# Patient Record
Sex: FEMALE | Race: WHITE
[De-identification: names, ages, dates, MRNs, and addresses within clinical notes are randomized per-mention and may not be internally consistent; named-entity substitution may affect disease eponyms.]

---

## 2017-01-06 ENCOUNTER — HOSPITAL ENCOUNTER (OUTPATIENT)
Dept: HOSPITAL 53 - M SFHCPLAZ | Age: 47
End: 2017-01-06
Attending: FAMILY MEDICINE
Payer: COMMERCIAL

## 2017-01-06 DIAGNOSIS — K52.9: Primary | ICD-10-CM

## 2017-01-06 LAB
ALBUMIN SERPL BCG-MCNC: 3.8 GM/DL (ref 3.2–5.2)
ALBUMIN/GLOB SERPL: 1.15 {RATIO} (ref 1–1.93)
ALP SERPL-CCNC: 144 U/L (ref 45–117)
ALT SERPL W P-5'-P-CCNC: 117 U/L (ref 12–78)
AMYLASE SERPL-CCNC: 40 U/L (ref 25–115)
AST SERPL-CCNC: 52 U/L (ref 15–37)
BASOPHILS # BLD AUTO: 0 K/MM3 (ref 0–0.2)
BASOPHILS NFR BLD AUTO: 0.4 % (ref 0–1)
BILIRUB CONJ SERPL-MCNC: < 0.1 MG/DL (ref 0–0.2)
BILIRUB SERPL-MCNC: 0.3 MG/DL (ref 0.2–1)
EOSINOPHIL # BLD AUTO: 0.2 K/MM3 (ref 0–0.5)
EOSINOPHIL NFR BLD AUTO: 2.7 % (ref 0–3)
ERYTHROCYTE [DISTWIDTH] IN BLOOD BY AUTOMATED COUNT: 12.1 % (ref 11.5–14.5)
ERYTHROCYTE [SEDIMENTATION RATE] IN BLOOD BY WESTERGREN METHOD: 12 MM/HR (ref 0–20)
LARGE UNSTAINED CELL #: 0.2 K/MM3 (ref 0–0.4)
LARGE UNSTAINED CELL %: 2 % (ref 0–4)
LYMPHOCYTES # BLD AUTO: 2.4 K/MM3 (ref 1.5–4.5)
LYMPHOCYTES NFR BLD AUTO: 27.8 % (ref 24–44)
MCH RBC QN AUTO: 30 PG (ref 27–33)
MCHC RBC AUTO-ENTMCNC: 32.9 G/DL (ref 32–36.5)
MCV RBC AUTO: 91.5 FL (ref 80–96)
MONOCYTES # BLD AUTO: 0.7 K/MM3 (ref 0–0.8)
MONOCYTES NFR BLD AUTO: 7.6 % (ref 0–5)
NEUTROPHILS # BLD AUTO: 5.1 K/MM3 (ref 1.8–7.7)
NEUTROPHILS NFR BLD AUTO: 59.5 % (ref 36–66)
PLATELET # BLD AUTO: 418 K/MM3 (ref 150–450)
PROT SERPL-MCNC: 7.1 GM/DL (ref 6.4–8.2)
WBC # BLD AUTO: 8.6 K/MM3 (ref 4–10)

## 2017-01-30 ENCOUNTER — HOSPITAL ENCOUNTER (OUTPATIENT)
Dept: HOSPITAL 53 - M SFHCPLAZ | Age: 47
End: 2017-01-30
Attending: PHYSICIAN ASSISTANT
Payer: COMMERCIAL

## 2017-01-30 DIAGNOSIS — R31.9: Primary | ICD-10-CM

## 2017-01-31 ENCOUNTER — HOSPITAL ENCOUNTER (OUTPATIENT)
Dept: HOSPITAL 53 - M SFHCPLAZ | Age: 47
End: 2017-01-31
Attending: PHYSICIAN ASSISTANT
Payer: COMMERCIAL

## 2017-01-31 ENCOUNTER — HOSPITAL ENCOUNTER (OUTPATIENT)
Dept: HOSPITAL 53 - M RAD | Age: 47
End: 2017-01-31
Attending: PHYSICIAN ASSISTANT
Payer: COMMERCIAL

## 2017-01-31 DIAGNOSIS — R31.9: Primary | ICD-10-CM

## 2017-01-31 LAB
ANION GAP SERPL CALC-SCNC: 10 MEQ/L (ref 8–16)
BASOPHILS # BLD AUTO: 0.1 K/MM3 (ref 0–0.2)
BASOPHILS NFR BLD AUTO: 1.3 % (ref 0–1)
BUN SERPL-MCNC: 10 MG/DL (ref 7–18)
CALCIUM SERPL-MCNC: 9 MG/DL (ref 8.5–10.1)
CHLORIDE SERPL-SCNC: 104 MEQ/L (ref 98–107)
CO2 SERPL-SCNC: 27 MEQ/L (ref 21–32)
CREAT SERPL-MCNC: 0.75 MG/DL (ref 0.55–1.02)
EOSINOPHIL # BLD AUTO: 0.1 K/MM3 (ref 0–0.5)
EOSINOPHIL NFR BLD AUTO: 1.6 % (ref 0–3)
ERYTHROCYTE [DISTWIDTH] IN BLOOD BY AUTOMATED COUNT: 13.1 % (ref 11.5–14.5)
GFR SERPL CREATININE-BSD FRML MDRD: > 60 ML/MIN/{1.73_M2} (ref 58–?)
GLUCOSE SERPL-MCNC: 97 MG/DL (ref 70–105)
LARGE UNSTAINED CELL #: 0.1 K/MM3 (ref 0–0.4)
LARGE UNSTAINED CELL %: 1.4 % (ref 0–4)
LYMPHOCYTES # BLD AUTO: 2.3 K/MM3 (ref 1.5–4.5)
LYMPHOCYTES NFR BLD AUTO: 28.1 % (ref 24–44)
MCH RBC QN AUTO: 30.5 PG (ref 27–33)
MCHC RBC AUTO-ENTMCNC: 32.8 G/DL (ref 32–36.5)
MCV RBC AUTO: 92.7 FL (ref 80–96)
MONOCYTES # BLD AUTO: 0.5 K/MM3 (ref 0–0.8)
MONOCYTES NFR BLD AUTO: 6.7 % (ref 0–5)
NEUTROPHILS # BLD AUTO: 4.9 K/MM3 (ref 1.8–7.7)
NEUTROPHILS NFR BLD AUTO: 60.9 % (ref 36–66)
PLATELET # BLD AUTO: 300 K/MM3 (ref 150–450)
POTASSIUM SERPL-SCNC: 3.9 MEQ/L (ref 3.5–5.1)
SODIUM SERPL-SCNC: 141 MEQ/L (ref 136–145)
WBC # BLD AUTO: 8 K/MM3 (ref 4–10)

## 2017-01-31 NOTE — REP
CT study of the abdomen and pelvis without IV or oral contrast:  Renal stone

protocol.

 

History:  Hematuria.  Question kidney stones.  Right-sided pain.

 

Comparison study December 29, 2016.

 

Findings:  Digital  radiograph demonstrates a normal bowel gas pattern.

There are clips in the right upper quadrant.  Metallic clothing artifact is

projecting in the right para lumbar region.  The lung bases are clear on axial CT

images.

 

The liver and the spleen are normal in size and homogeneous in texture.  There

are clips in the gallbladder fossa.  No adrenal lesion is seen.  A small

accessory splenule is noted in the left upper quadrant unchanged.  No pancreatic

abnormality is seen.  A normal appendix is observed in the right mid abdomen

centrally.  There is a 2.1 cm cystic area in the right ovary.  No significant

adnexal abnormality is seen.  There appear to be tubal ligation clips

bilaterally.

 

There is mild right-sided hydronephrosis and rounded fullness in the renal pelvis

on the right suggests some degree of ureteropelvic junction obstruction on the

right side.  This morphologic appearance is unchanged from December 29, 2016

prior study.  No ureteral or intrarenal calculus is observed.  No bladder mass or

calculus is seen.  There are two or three diverticula in the left colon without

CT evidence of diverticulitis.  Small and large intestinal bowel loops are

otherwise normal.  No abdominal wall defect is seen.

 

Impression:

 

1.  Question mild right ureteropelvic junction obstruction with right-sided

hydronephrosis.

 

2.  No urinary tract calculus seen.

 

3.  Status post cholecystectomy and bilateral tubal ligation.

 

 

Signed by

Yung Flowers MD 01/31/2017 12:32 P

## 2017-02-24 ENCOUNTER — HOSPITAL ENCOUNTER (OUTPATIENT)
Dept: HOSPITAL 53 - M RAD | Age: 47
End: 2017-02-24
Attending: PHYSICIAN ASSISTANT
Payer: COMMERCIAL

## 2017-02-24 DIAGNOSIS — N13.30: Primary | ICD-10-CM

## 2017-02-24 NOTE — REP
Clinical:  Right hydronephrosis for follow up.

 

Comparison:  CT dated 01/31/2017.

 

Technique:  Real time gray scale and color evaluation using curved array

transducer with color Doppler evaluation of the intra renal vasculature.

 

Findings:

 

The right kidney demonstrates moderate hydronephrosis and is normal in contour,

size, echogenicity and reniform shape measuring 10.9 x 4.5 x 6.4 cm.  Doppler

evaluation demonstrates normal intrarenal vascularity.

 

The left kidney is normal in contour, size, echogenicity, reniform shape and

measures 11.8 x 6.3 x 4.9 cm.  Doppler evaluation demonstrates normal intrarenal

vascularity.  There is no hydronephrosis, nephrolithiasis, cystic or mass lesion

appreciated.

 

The bladder is unremarkable and bilateral ureteral jets are appreciated.

 

Impression:

Moderate right hydronephrosis which may be secondary to UPJ obstruction based on

recent CT evaluation.

Normal left kidney and bladder.

 

 

Signed by

Rui Ashraf MD 02/24/2017 11:38 A

## 2017-03-04 ENCOUNTER — HOSPITAL ENCOUNTER (OUTPATIENT)
Dept: HOSPITAL 53 - M LAB | Age: 47
End: 2017-03-04
Attending: FAMILY MEDICINE
Payer: COMMERCIAL

## 2017-03-04 DIAGNOSIS — N13.30: Primary | ICD-10-CM

## 2017-03-04 LAB
ALBUMIN SERPL BCG-MCNC: 3.6 GM/DL (ref 3.2–5.2)
ANION GAP SERPL CALC-SCNC: 8 MEQ/L (ref 8–16)
BUN SERPL-MCNC: 13 MG/DL (ref 7–18)
CALCIUM SERPL-MCNC: 8.6 MG/DL (ref 8.5–10.1)
CHLORIDE SERPL-SCNC: 107 MEQ/L (ref 98–107)
CO2 SERPL-SCNC: 27 MEQ/L (ref 21–32)
CREAT SERPL-MCNC: 0.69 MG/DL (ref 0.55–1.02)
GFR SERPL CREATININE-BSD FRML MDRD: > 60 ML/MIN/{1.73_M2} (ref 58–?)
GLUCOSE SERPL-MCNC: 67 MG/DL (ref 70–105)
PHOSPHATE SERPL-MCNC: 3.1 MG/DL (ref 2.5–4.9)
POTASSIUM SERPL-SCNC: 4.6 MEQ/L (ref 3.5–5.1)
SODIUM SERPL-SCNC: 142 MEQ/L (ref 136–145)

## 2017-03-13 ENCOUNTER — HOSPITAL ENCOUNTER (OUTPATIENT)
Dept: HOSPITAL 53 - M RAD | Age: 47
End: 2017-03-13
Attending: UROLOGY
Payer: COMMERCIAL

## 2017-03-13 DIAGNOSIS — N13.5: Primary | ICD-10-CM

## 2017-03-13 NOTE — REP
Nuclear renal scintigraphy with differential flow and function analysis.

 

History:  Ureteropelvic junction obstruction.

 

Technique:  8.4 mCi of technetium 99m MAG3 is injected and posterior flow and

excretory phase images are acquired.  Renal cortical regions of interest are

drawn and time activity curves are plotted for renal functional analysis.

 

Comparison study:  September 15, 2009.

 

Findings:  Posterior flow images show symmetric perfusion of the renal beds.  No

change from comparison study.  Excretory phase images show no evidence of

intrarenal mass on either side on early images.  The intrarenal collecting

systems are labeled symmetrically at 3 minutes.  Subsequent images demonstrate

fullness of the intrarenal collecting system of the right kidney with a rounded

contour at the ureteropelvic junction consistent with some degree of UPJ

obstruction.  The right ureter is labeled with tracer.  Fullness in the

intrarenal collecting system is more prominent than on the 2009 prior exam.  It

persists on the postvoid image.

 

Differential renal function analysis is essentially normal with 52% of overall

renal cortical counts coming from the left kidney and 48% from the right.  Time

to peak activity is normal bilaterally at 2 minutes on the left and 3.0 minutes

on the right.  Time to half max activity is normal on the left at 9.3 minutes and

somewhat delayed on the right at 16.5 minutes.

 

Impression:

 

Evidence of mild to moderate right-sided hydronephrosis with a somewhat rounded

appearance to the ureteropelvic junction which may reflect UPJ obstruction.  This

is more pronounced in appearance than on the 2009 prior study.

 

 

Signed by

Yung Flowers MD 03/13/2017 07:18 P

## 2017-04-06 ENCOUNTER — HOSPITAL ENCOUNTER (OUTPATIENT)
Dept: HOSPITAL 53 - M SFHCPLAZ | Age: 47
End: 2017-04-06
Attending: PHYSICIAN ASSISTANT
Payer: COMMERCIAL

## 2017-04-06 DIAGNOSIS — N13.30: Primary | ICD-10-CM

## 2017-09-18 ENCOUNTER — HOSPITAL ENCOUNTER (OUTPATIENT)
Dept: HOSPITAL 53 - M SMT | Age: 47
End: 2017-09-18
Attending: UROLOGY
Payer: COMMERCIAL

## 2017-09-18 DIAGNOSIS — N13.5: Primary | ICD-10-CM

## 2017-09-18 NOTE — REP
RENAL AND BLADDER ULTRASOUND:

 

Real-time sonographic evaluation of the kidneys and bladder performed and

compared to the prior study of 02/24/2017.

 

Right kidney measures 10.9 x 5.6 x 4.8 cm and the left kidney 12.3 x 5.8 x 4.7

cm.  There is again moderate right hydronephrosis, which is unchanged since the

prior exam. There is no left hydronephrosis.  There is no renal mass or calculus

identified.  The urinary bladder measures 8.9 x 9.4 x 7.3 cm with no evidence of

mass or calculus.  Volume is 320 mL.  Ureteral jets are seen in the urinary

bladder bilaterally with Doppler color evaluation.

 

IMPRESSION:

 

Stable moderate right hydronephrosis.

 

 

Signed by

Gerardo Nava MD 09/18/2017 05:04 P

## 2017-11-16 ENCOUNTER — HOSPITAL ENCOUNTER (OUTPATIENT)
Dept: HOSPITAL 53 - M SFHCPLAZ | Age: 47
End: 2017-11-16
Attending: PHYSICIAN ASSISTANT
Payer: COMMERCIAL

## 2017-11-16 DIAGNOSIS — N13.30: Primary | ICD-10-CM

## 2017-11-16 LAB
ALBUMIN SERPL BCG-MCNC: 3.8 GM/DL (ref 3.2–5.2)
ANION GAP SERPL CALC-SCNC: 9 MEQ/L (ref 8–16)
BUN SERPL-MCNC: 16 MG/DL (ref 7–18)
CALCIUM SERPL-MCNC: 9.1 MG/DL (ref 8.5–10.1)
CHLORIDE SERPL-SCNC: 105 MEQ/L (ref 98–107)
CO2 SERPL-SCNC: 25 MEQ/L (ref 21–32)
CREAT SERPL-MCNC: 0.74 MG/DL (ref 0.55–1.02)
GFR SERPL CREATININE-BSD FRML MDRD: > 60 ML/MIN/{1.73_M2} (ref 58–?)
GLUCOSE SERPL-MCNC: 82 MG/DL (ref 70–105)
PHOSPHATE SERPL-MCNC: 4 MG/DL (ref 2.5–4.9)
POTASSIUM SERPL-SCNC: 4.6 MEQ/L (ref 3.5–5.1)
SODIUM SERPL-SCNC: 139 MEQ/L (ref 136–145)

## 2017-11-21 ENCOUNTER — HOSPITAL ENCOUNTER (OUTPATIENT)
Dept: HOSPITAL 53 - M SFHCPLAZ | Age: 47
End: 2017-11-21
Attending: FAMILY MEDICINE
Payer: COMMERCIAL

## 2017-11-21 DIAGNOSIS — E78.5: ICD-10-CM

## 2017-11-21 DIAGNOSIS — E66.9: Primary | ICD-10-CM

## 2017-11-21 DIAGNOSIS — E55.9: ICD-10-CM

## 2017-11-21 DIAGNOSIS — N13.30: ICD-10-CM

## 2017-11-21 LAB
CHOLEST SERPL-MCNC: 201 MG/DL (ref ?–200)
EST. AVERAGE GLUCOSE BLD GHB EST-MCNC: 100 MG/DL (ref 60–110)
T4 FREE SERPL-MCNC: 1.09 NG/DL (ref 0.76–1.46)
TRIGL SERPL-MCNC: 96 MG/DL (ref ?–150)
URATE SERPL-MCNC: 4.9 MG/DL (ref 2.6–6)

## 2017-11-27 ENCOUNTER — HOSPITAL ENCOUNTER (OUTPATIENT)
Dept: HOSPITAL 53 - M SMT | Age: 47
End: 2017-11-27
Attending: FAMILY MEDICINE
Payer: COMMERCIAL

## 2017-11-27 DIAGNOSIS — Z53.9: ICD-10-CM

## 2017-11-27 DIAGNOSIS — M75.41: Primary | ICD-10-CM

## 2017-11-30 ENCOUNTER — HOSPITAL ENCOUNTER (OUTPATIENT)
Dept: HOSPITAL 53 - M SMT | Age: 47
End: 2017-11-30
Attending: FAMILY MEDICINE
Payer: COMMERCIAL

## 2017-11-30 DIAGNOSIS — M75.42: Primary | ICD-10-CM

## 2017-11-30 NOTE — REP
Left shoulder series:  Three views.

 

History:  Impingement.

 

Findings:  The left glenohumeral and acromioclavicular joints are normally

aligned.  Periarticular soft tissues are unremarkable.  No other abnormality.

 

Impression:

 

Negative left shoulder radiographs.

 

 

Signed by

Yung Flowers MD 11/30/2017 04:08 P

## 2018-02-13 ENCOUNTER — HOSPITAL ENCOUNTER (OUTPATIENT)
Dept: HOSPITAL 53 - M SFHCPLAZ | Age: 48
End: 2018-02-13
Attending: FAMILY MEDICINE
Payer: COMMERCIAL

## 2018-02-13 DIAGNOSIS — R10.11: Primary | ICD-10-CM

## 2018-02-13 LAB
ALBUMIN/GLOBULIN RATIO: 1.15 (ref 1–1.93)
ALBUMIN: 3.8 GM/DL (ref 3.2–5.2)
ALKALINE PHOSPHATASE: 88 U/L (ref 45–117)
ALT SERPL W P-5'-P-CCNC: 26 U/L (ref 12–78)
ANION GAP: 7 MEQ/L (ref 8–16)
APPEARANCE, URINE: CLEAR
AST SERPL-CCNC: 21 U/L (ref 7–37)
BACTERIA UR QL AUTO: NEGATIVE
BILIRUBIN, URINE AUTO: NEGATIVE
BILIRUBIN,TOTAL: 0.4 MG/DL (ref 0.2–1)
BLOOD UREA NITROGEN: 15 MG/DL (ref 7–18)
BLOOD, URINE BLOOD: NEGATIVE
CALCIUM LEVEL: 9.3 MG/DL (ref 8.5–10.1)
CARBON DIOXIDE LEVEL: 28 MEQ/L (ref 21–32)
CHLORIDE LEVEL: 105 MEQ/L (ref 98–107)
CREATININE FOR GFR: 0.74 MG/DL (ref 0.55–1.3)
GFR SERPL CREATININE-BSD FRML MDRD: > 60 ML/MIN/{1.73_M2} (ref 58–?)
GLUCOSE, FASTING: 82 MG/DL (ref 70–100)
GLUCOSE, URINE (UA) AUTO: NEGATIVE MG/DL
KETONE, URINE AUTO: NEGATIVE MG/DL
LEUKOCYTE ESTERASE UR QL STRIP.AUTO: NEGATIVE
NITRITE, URINE AUTO: NEGATIVE
PH,URINE: 7 UNITS (ref 5–9)
POTASSIUM SERUM: 4.5 MEQ/L (ref 3.5–5.1)
PROT UR QL STRIP.AUTO: NEGATIVE MG/DL
RBC, URINE AUTO: 0 /HPF (ref 0–3)
SODIUM LEVEL: 140 MEQ/L (ref 136–145)
SPECIFIC GRAVITY URINE AUTO: 1.01 (ref 1–1.03)
SQUAMOUS #/AREA URNS AUTO: 0 /HPF (ref 0–6)
TOTAL PROTEIN: 7.1 GM/DL (ref 6.4–8.2)
UROBILINOGEN, URINE AUTO: 0.2 MG/DL (ref 0–2)
WBC, URINE AUTO: 0 /HPF (ref 0–3)

## 2018-02-13 PROCEDURE — 80053 COMPREHEN METABOLIC PANEL: CPT

## 2018-02-16 ENCOUNTER — HOSPITAL ENCOUNTER (OUTPATIENT)
Dept: HOSPITAL 53 - M RAD | Age: 48
End: 2018-02-16
Attending: FAMILY MEDICINE
Payer: COMMERCIAL

## 2018-02-16 DIAGNOSIS — R10.11: ICD-10-CM

## 2018-02-16 DIAGNOSIS — N13.30: Primary | ICD-10-CM

## 2018-02-16 DIAGNOSIS — K76.0: ICD-10-CM

## 2018-02-16 PROCEDURE — 76700 US EXAM ABDOM COMPLETE: CPT

## 2018-03-06 ENCOUNTER — HOSPITAL ENCOUNTER (OUTPATIENT)
Dept: HOSPITAL 53 - M RAD | Age: 48
End: 2018-03-06
Attending: UROLOGY
Payer: COMMERCIAL

## 2018-03-06 DIAGNOSIS — N13.5: Primary | ICD-10-CM

## 2018-04-13 ENCOUNTER — HOSPITAL ENCOUNTER (OUTPATIENT)
Dept: HOSPITAL 53 - M WHC | Age: 48
End: 2018-04-13
Attending: FAMILY MEDICINE
Payer: COMMERCIAL

## 2018-04-13 DIAGNOSIS — Z12.39: Primary | ICD-10-CM

## 2018-04-13 PROCEDURE — 77067 SCR MAMMO BI INCL CAD: CPT

## 2018-05-17 ENCOUNTER — HOSPITAL ENCOUNTER (OUTPATIENT)
Dept: HOSPITAL 53 - M SFHCPLAZ | Age: 48
End: 2018-05-17
Attending: FAMILY MEDICINE
Payer: COMMERCIAL

## 2018-05-17 DIAGNOSIS — E66.9: ICD-10-CM

## 2018-05-17 DIAGNOSIS — E55.9: Primary | ICD-10-CM

## 2018-05-17 DIAGNOSIS — E78.5: ICD-10-CM

## 2018-05-17 LAB
25(OH)D3 SERPL-MCNC: 46.7 NG/ML (ref 30–100)
ALBUMIN/GLOBULIN RATIO: 1.06 (ref 1–1.93)
ALBUMIN: 3.7 GM/DL (ref 3.2–5.2)
ALKALINE PHOSPHATASE: 85 U/L (ref 45–117)
ALT SERPL W P-5'-P-CCNC: 33 U/L (ref 12–78)
ANION GAP: 5 MEQ/L (ref 8–16)
AST SERPL-CCNC: 21 U/L (ref 7–37)
BILIRUBIN,TOTAL: 0.4 MG/DL (ref 0.2–1)
BLOOD UREA NITROGEN: 16 MG/DL (ref 7–18)
CALCIUM LEVEL: 9 MG/DL (ref 8.5–10.1)
CARBON DIOXIDE LEVEL: 27 MEQ/L (ref 21–32)
CHLORIDE LEVEL: 108 MEQ/L (ref 98–107)
CHOLEST SERPL-MCNC: 177 MG/DL (ref ?–200)
CHOLESTEROL RISK RATIO: 3.05 (ref ?–5)
CK SERPL-CCNC: 105 U/L (ref 26–192)
CREATININE FOR GFR: 0.77 MG/DL (ref 0.55–1.3)
CRP SERPL-MCNC: < 0.3 MG/DL (ref 0–0.3)
EST. AVERAGE GLUCOSE BLD GHB EST-MCNC: 105 MG/DL (ref 60–110)
GFR SERPL CREATININE-BSD FRML MDRD: > 60 ML/MIN/{1.73_M2} (ref 58–?)
GLUCOSE, FASTING: 84 MG/DL (ref 70–100)
HDLC SERPL-MCNC: 58 MG/DL (ref 40–?)
LDL CHOLESTEROL: 101.2 MG/DL (ref ?–100)
NONHDLC SERPL-MCNC: 119 MG/DL
POTASSIUM SERUM: 4.6 MEQ/L (ref 3.5–5.1)
PTH-INTACT SERPL-MCNC: 18.8 PG/ML (ref 18.5–88)
SODIUM LEVEL: 140 MEQ/L (ref 136–145)
TOTAL PROTEIN: 7.2 GM/DL (ref 6.4–8.2)
TRIGLYCERIDES LEVEL: 89 MG/DL (ref ?–150)

## 2018-05-17 PROCEDURE — 82550 ASSAY OF CK (CPK): CPT

## 2018-05-18 LAB — INSULIN LEVEL: 9.3 UIU/ML (ref 2.6–24.9)

## 2018-08-01 ENCOUNTER — HOSPITAL ENCOUNTER (OUTPATIENT)
Dept: HOSPITAL 53 - M SFHCWAGY | Age: 48
End: 2018-08-01
Attending: NURSE PRACTITIONER
Payer: COMMERCIAL

## 2018-08-01 DIAGNOSIS — Z12.4: Primary | ICD-10-CM

## 2018-08-21 ENCOUNTER — HOSPITAL ENCOUNTER (OUTPATIENT)
Dept: HOSPITAL 53 - M WHC | Age: 48
End: 2018-08-21
Attending: NURSE PRACTITIONER
Payer: COMMERCIAL

## 2018-08-21 DIAGNOSIS — N92.0: Primary | ICD-10-CM

## 2018-08-21 PROCEDURE — 76830 TRANSVAGINAL US NON-OB: CPT

## 2018-09-13 ENCOUNTER — HOSPITAL ENCOUNTER (OUTPATIENT)
Dept: HOSPITAL 53 - M RAD | Age: 48
End: 2018-09-13
Attending: UROLOGY
Payer: COMMERCIAL

## 2018-09-13 DIAGNOSIS — N13.5: Primary | ICD-10-CM

## 2018-09-28 ENCOUNTER — HOSPITAL ENCOUNTER (OUTPATIENT)
Dept: HOSPITAL 53 - M SFHCPLAZ | Age: 48
End: 2018-09-28
Attending: FAMILY MEDICINE
Payer: COMMERCIAL

## 2018-09-28 DIAGNOSIS — E55.9: ICD-10-CM

## 2018-09-28 DIAGNOSIS — M06.9: Primary | ICD-10-CM

## 2018-09-28 LAB
25(OH)D3 SERPL-MCNC: 35.7 NG/ML (ref 30–100)
ALBUMIN/GLOBULIN RATIO: 1.12 (ref 1–1.93)
ALBUMIN: 3.8 GM/DL (ref 3.2–5.2)
ALKALINE PHOSPHATASE: 86 U/L (ref 45–117)
ALT SERPL W P-5'-P-CCNC: 33 U/L (ref 12–78)
ANION GAP: 9 MEQ/L (ref 8–16)
AST SERPL-CCNC: 22 U/L (ref 7–37)
BILIRUBIN,TOTAL: 0.5 MG/DL (ref 0.2–1)
BLOOD UREA NITROGEN: 13 MG/DL (ref 7–18)
CALCIUM LEVEL: 9 MG/DL (ref 8.5–10.1)
CARBON DIOXIDE LEVEL: 25 MEQ/L (ref 21–32)
CHLORIDE LEVEL: 107 MEQ/L (ref 98–107)
CREATININE FOR GFR: 0.71 MG/DL (ref 0.55–1.3)
GFR SERPL CREATININE-BSD FRML MDRD: > 60 ML/MIN/{1.73_M2} (ref 58–?)
GLUCOSE, FASTING: 80 MG/DL (ref 70–100)
POTASSIUM SERUM: 4.3 MEQ/L (ref 3.5–5.1)
PTH-INTACT SERPL-MCNC: 17.6 PG/ML (ref 18.5–88)
SODIUM LEVEL: 141 MEQ/L (ref 136–145)
TOTAL PROTEIN: 7.2 GM/DL (ref 6.4–8.2)

## 2018-09-28 PROCEDURE — 80053 COMPREHEN METABOLIC PANEL: CPT

## 2018-10-03 LAB — GAD65 AB SER-ACNC: <5 U/ML (ref 0–5)

## 2018-11-01 ENCOUNTER — HOSPITAL ENCOUNTER (OUTPATIENT)
Dept: HOSPITAL 53 - M SFHCWAGY | Age: 48
End: 2018-11-01
Attending: NURSE PRACTITIONER
Payer: COMMERCIAL

## 2018-11-01 DIAGNOSIS — Z53.9: Primary | ICD-10-CM

## 2018-11-01 DIAGNOSIS — Z11.3: Primary | ICD-10-CM

## 2018-11-01 LAB
CHLAMYDIA DNA AMPLIFICATION: NEGATIVE
GC DNA AMPLIFICATION: NEGATIVE

## 2018-11-01 PROCEDURE — 87591 N.GONORRHOEAE DNA AMP PROB: CPT

## 2018-12-16 ENCOUNTER — HOSPITAL ENCOUNTER (OUTPATIENT)
Dept: HOSPITAL 53 - M WUC | Age: 48
End: 2018-12-16
Attending: PHYSICIAN ASSISTANT
Payer: COMMERCIAL
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
## 2018-12-16 DIAGNOSIS — S73.111A: Primary | ICD-10-CM

## 2018-12-16 NOTE — REP
REASON:  Soft tissue pain.

 

FINDINGS:

 

The joint space is symmetric and relatively well maintained.  There is no acute

fracture or dislocation.

 

 

 

 

Electronically Signed by

Huy Orozco DO 12/16/2018 12:24 P

## 2019-08-01 ENCOUNTER — HOSPITAL ENCOUNTER (OUTPATIENT)
Dept: HOSPITAL 53 - M SFHCPLAZ | Age: 49
End: 2019-08-01
Attending: FAMILY MEDICINE
Payer: COMMERCIAL

## 2019-08-01 DIAGNOSIS — E66.9: ICD-10-CM

## 2019-08-01 DIAGNOSIS — E55.9: Primary | ICD-10-CM

## 2019-08-01 LAB
25(OH)D3 SERPL-MCNC: 45.7 NG/ML (ref 30–100)
ALBUMIN SERPL BCG-MCNC: 3.7 GM/DL (ref 3.2–5.2)
ALT SERPL W P-5'-P-CCNC: 42 U/L (ref 12–78)
BILIRUB SERPL-MCNC: 0.4 MG/DL (ref 0.2–1)
BUN SERPL-MCNC: 17 MG/DL (ref 7–18)
CALCIUM SERPL-MCNC: 8.8 MG/DL (ref 8.5–10.1)
CHLORIDE SERPL-SCNC: 110 MEQ/L (ref 98–107)
CHOLEST SERPL-MCNC: 196 MG/DL (ref ?–200)
CHOLEST/HDLC SERPL: 3.21 {RATIO} (ref ?–5)
CO2 SERPL-SCNC: 24 MEQ/L (ref 21–32)
CREAT SERPL-MCNC: 0.8 MG/DL (ref 0.55–1.3)
EST. AVERAGE GLUCOSE BLD GHB EST-MCNC: 103 MG/DL (ref 60–110)
GFR SERPL CREATININE-BSD FRML MDRD: > 60 ML/MIN/{1.73_M2} (ref 51–?)
GLUCOSE SERPL-MCNC: 72 MG/DL (ref 70–100)
HDLC SERPL-MCNC: 61 MG/DL (ref 40–?)
LDLC SERPL CALC-MCNC: 111 MG/DL (ref ?–100)
NONHDLC SERPL-MCNC: 135 MG/DL
POTASSIUM SERPL-SCNC: 4.4 MEQ/L (ref 3.5–5.1)
PROT SERPL-MCNC: 7 GM/DL (ref 6.4–8.2)
PTH-INTACT SERPL-MCNC: 24.4 PG/ML (ref 18.5–88)
SODIUM SERPL-SCNC: 141 MEQ/L (ref 136–145)
T4 FREE SERPL-MCNC: 0.99 NG/DL (ref 0.76–1.46)
TRIGL SERPL-MCNC: 118 MG/DL (ref ?–150)
TSH SERPL DL<=0.005 MIU/L-ACNC: 1.82 UIU/ML (ref 0.36–3.74)

## 2019-08-27 ENCOUNTER — HOSPITAL ENCOUNTER (OUTPATIENT)
Dept: HOSPITAL 53 - M SFHCWAGY | Age: 49
End: 2019-08-27
Attending: NURSE PRACTITIONER
Payer: COMMERCIAL

## 2019-08-27 DIAGNOSIS — N84.1: Primary | ICD-10-CM

## 2019-08-27 LAB
CHLAMYDIA DNA AMPLIFICATION: NEGATIVE
N GONORRHOEA RRNA SPEC QL NAA+PROBE: NEGATIVE

## 2020-01-13 ENCOUNTER — HOSPITAL ENCOUNTER (OUTPATIENT)
Dept: HOSPITAL 53 - M SFHCPLAZ | Age: 50
End: 2020-01-13
Attending: NURSE PRACTITIONER
Payer: COMMERCIAL

## 2020-01-13 DIAGNOSIS — M06.9: Primary | ICD-10-CM

## 2020-01-13 DIAGNOSIS — E78.5: ICD-10-CM

## 2020-01-13 DIAGNOSIS — R73.09: ICD-10-CM

## 2020-01-13 DIAGNOSIS — E55.9: ICD-10-CM

## 2020-01-13 LAB
25(OH)D3 SERPL-MCNC: 53.2 NG/ML (ref 30–100)
ALBUMIN SERPL BCG-MCNC: 3.6 GM/DL (ref 3.2–5.2)
ALT SERPL W P-5'-P-CCNC: 18 U/L (ref 12–78)
BASOPHILS # BLD AUTO: 0 10^3/UL (ref 0–0.2)
BASOPHILS NFR BLD AUTO: 0.7 % (ref 0–1)
BILIRUB SERPL-MCNC: 0.4 MG/DL (ref 0.2–1)
BUN SERPL-MCNC: 13 MG/DL (ref 7–18)
CALCIUM SERPL-MCNC: 8.7 MG/DL (ref 8.5–10.1)
CHLORIDE SERPL-SCNC: 109 MEQ/L (ref 98–107)
CHOLEST SERPL-MCNC: 194 MG/DL (ref ?–200)
CHOLEST/HDLC SERPL: 3.88 {RATIO} (ref ?–5)
CO2 SERPL-SCNC: 23 MEQ/L (ref 21–32)
CREAT SERPL-MCNC: 0.8 MG/DL (ref 0.55–1.3)
EOSINOPHIL # BLD AUTO: 0.1 10^3/UL (ref 0–0.5)
EOSINOPHIL NFR BLD AUTO: 1.8 % (ref 0–3)
EST. AVERAGE GLUCOSE BLD GHB EST-MCNC: 100 MG/DL (ref 60–110)
GFR SERPL CREATININE-BSD FRML MDRD: > 60 ML/MIN/{1.73_M2} (ref 51–?)
GLUCOSE SERPL-MCNC: 94 MG/DL (ref 70–100)
HCT VFR BLD AUTO: 39.3 % (ref 36–47)
HDLC SERPL-MCNC: 50 MG/DL (ref 40–?)
HGB BLD-MCNC: 12.9 G/DL (ref 12–15.5)
LDLC SERPL CALC-MCNC: 120 MG/DL (ref ?–100)
LYMPHOCYTES # BLD AUTO: 1.4 10^3/UL (ref 1.5–5)
LYMPHOCYTES NFR BLD AUTO: 23.1 % (ref 24–44)
MCH RBC QN AUTO: 29.6 PG (ref 27–33)
MCHC RBC AUTO-ENTMCNC: 32.8 G/DL (ref 32–36.5)
MCV RBC AUTO: 90.1 FL (ref 80–96)
MONOCYTES # BLD AUTO: 0.5 10^3/UL (ref 0–0.8)
MONOCYTES NFR BLD AUTO: 8.5 % (ref 0–5)
NEUTROPHILS # BLD AUTO: 4.1 10^3/UL (ref 1.5–8.5)
NEUTROPHILS NFR BLD AUTO: 65.7 % (ref 36–66)
NONHDLC SERPL-MCNC: 144 MG/DL
PLATELET # BLD AUTO: 356 10^3/UL (ref 150–450)
POTASSIUM SERPL-SCNC: 4 MEQ/L (ref 3.5–5.1)
PROT SERPL-MCNC: 7 GM/DL (ref 6.4–8.2)
PTH-INTACT SERPL-MCNC: 40 PG/ML (ref 18.5–88)
RBC # BLD AUTO: 4.36 10^6/UL (ref 4–5.4)
SODIUM SERPL-SCNC: 139 MEQ/L (ref 136–145)
T4 FREE SERPL-MCNC: 1.07 NG/DL (ref 0.76–1.46)
TRIGL SERPL-MCNC: 118 MG/DL (ref ?–150)
TSH SERPL DL<=0.005 MIU/L-ACNC: 1.85 UIU/ML (ref 0.36–3.74)
WBC # BLD AUTO: 6.2 10^3/UL (ref 4–10)

## 2020-02-26 ENCOUNTER — HOSPITAL ENCOUNTER (OUTPATIENT)
Dept: HOSPITAL 53 - M SFHCWAGY | Age: 50
End: 2020-02-26
Attending: NURSE PRACTITIONER
Payer: COMMERCIAL

## 2020-02-26 DIAGNOSIS — Z11.3: Primary | ICD-10-CM

## 2020-02-26 LAB
CHLAMYDIA DNA AMPLIFICATION: NEGATIVE
N GONORRHOEA RRNA SPEC QL NAA+PROBE: NEGATIVE

## 2020-03-02 ENCOUNTER — HOSPITAL ENCOUNTER (OUTPATIENT)
Dept: HOSPITAL 53 - M WHC | Age: 50
End: 2020-03-02
Attending: FAMILY MEDICINE
Payer: COMMERCIAL

## 2020-03-02 DIAGNOSIS — N63.20: ICD-10-CM

## 2020-03-02 DIAGNOSIS — Z12.31: Primary | ICD-10-CM

## 2020-03-02 DIAGNOSIS — Z80.0: ICD-10-CM

## 2020-03-02 NOTE — REPMRS
Patient History

The patient states she has not had a clinical breast exam in over

a year.

Family history of colorectal cancer at age 50 or over in maternal

grandfather.

US Guided Breast Biopsy of the left breast, February 7, 2014.

 

Digital Woman Screen Mammo: March 2, 2020 - Exam #: 

[id number]

Bilateral CC and MLO view(s) were taken.

 

Technologist: Maria Alejandra Harris, Technologist

Prior study comparison: April 13, 2018, digital woman screen 

mammo performed at [facility].  

October 10, 2016, digital woman screen mammo performed at 

[facility].  January 3, 2014, 

digital woman screen mammo performed at [facility].

 

FINDINGS: There are scattered fibroglandular densities.  There is

a needle biopsy marker clip within a soft tissue density in the 

medial anterior third of the left breast unchanged from prior 

studies. The needle biopsy pathology result was benign 

fibrocystic changes. This nodule is a little larger. It measured 

15 mm in greatest dimension on the MLO view from 2014.  Today it 

measures 25 mm in greatest diameter.  There is a stable 

asymmetric breast parenchymal density in the posteromedial aspect

of the left breast entirely unchanged from 2014. There has been 

no other change in the appearance of the mammogram from the prior

studies.  There is a mild amount of scattered fibroglandular 

density which is fairly symmetric. There is no other interval 

development of dominant mass, architectural distortion, or 

grouped microcalcification suggestive of malignancy.

3-D tomosynthesis shows no additional findings.

 

Assessment: BI-RADS/ACR category 0 mammogram, Incomplete: Need 

additional imaging evaluation and/or prior mammograms for 

comparison.

 

Recommendation

Ultrasound and special view mammogram of the left breast (for 

women over age 40).

This patient's Lifetime Breast Cancer Risk is estimated at 9.6 %.

This mammogram was interpreted with the aid of an FDA-approved 

computer-aided dectection system.

 

Electronically Signed By: Satish Flowers MD 03/02/20 [id number]

## 2020-03-09 ENCOUNTER — HOSPITAL ENCOUNTER (OUTPATIENT)
Dept: HOSPITAL 53 - M WHC | Age: 50
End: 2020-03-09
Attending: FAMILY MEDICINE
Payer: COMMERCIAL

## 2020-03-09 DIAGNOSIS — Z12.31: Primary | ICD-10-CM

## 2020-03-09 NOTE — REP
Digital diagnostic unilateral left breast mammography with CAD, and focused left

breast sonography:

 

History:  Screening mammography from March 2, 2020 was BIRADS category 0.

Current mammography showed a nodule in the 6 o'clock position in the left breast

which has enlarged since the 2014 prior studies.  Diagnostic imaging was

recommended.  Previous biopsy result was benign fibrocystic change.

 

Mammographic findings:  Magnified focal spot compression craniocaudal,

mediolateral, and MLO views are obtained.  These show a 2.5 x 2.4 x 1.7 cm fairly

well circumscribed oval-shaped opacity in the inferior aspect of the left breast

just medial to the plane of the nipple at 6 to 7 o'clock position.  As noted on

screening study, this is larger than it was 2014.  No other mammographic

abnormality is observed.

 

Sonographic findings:  In the 6 o'clock position of the left breast there is a

complex solid heterogeneous oval-shaped lesion with its long axis parallel to the

skin.  This measures 2.6 x 2.7 x 1.0 cm sonographically.

 

Impression:

 

BIRADS 4:  BI-RADS/ACR category 4 mammogram.  Suspicious Abnormality - biopsy

should be considered.

 

Gradually enlarging oval shaped nodule 6 o'clock position left breast with mixed

echogenicity.  Differential possibilities include breast hamartoma, atypical

fibroadenoma, and other breast neoplasm.  BIRADS category 4 suspicious findings.

Consider excisional biopsy versus a repeat needle biopsy.

 

This mammogram was interpreted with the aid of an FDA-approved computer-aided

detection system.

 

The patient states that she/he has not had a clinical breast exam in over a year.

 

 

The patient letter being requested is m4.

 

This patient's estimated Tyrer-Cuzick lifetime risk assessment for breast cancer

is 9.6 %.

## 2020-04-07 ENCOUNTER — HOSPITAL ENCOUNTER (OUTPATIENT)
Dept: HOSPITAL 53 - M WHC | Age: 50
End: 2020-04-07
Attending: SURGERY
Payer: COMMERCIAL

## 2020-04-07 DIAGNOSIS — N63.20: Primary | ICD-10-CM

## 2020-04-07 NOTE — REP
FOCUSED LEFT BREAST SONOGRAPHY:

 

HISTORY:  Palpable mass at 1 o'clock position 5.5 cm from the nipple and at 8

o'clock, 3.5 cm from the nipple.  Previous focused left breast sonography at 6

o'clock.  Comparison mammography March 9, 2020.

 

SONOGRAPHIC FINDINGS:  Sonographic evaluation of the left breast in the 8 o'clock

region 3 cm from nipple shows normal stromal elements.  No sonographic mass or

cyst is seen.

 

At the 1 o'clock position, 5 cm from the nipple there is a heterogeneous

hypoechoic area, 5 mm in greatest diameter which apparently corresponds to the

palpable abnormality.  There is some acoustic shadowing.  No mammographic

correlate.

 

IMPRESSION:

 

At the 1 o'clock position there is heterogeneous hypoechoic area casting some

shadowing 5 mm in diameter.  This appears to correspond with the physical exam

findings.  Histologic sampling should be considered here as well.  No sonographic

abnormality is noted in the 8 o'clock position.

## 2020-04-14 ENCOUNTER — HOSPITAL ENCOUNTER (OUTPATIENT)
Dept: HOSPITAL 53 - M WHCPRO | Age: 50
End: 2020-04-14
Attending: SURGERY
Payer: COMMERCIAL

## 2020-04-14 VITALS — SYSTOLIC BLOOD PRESSURE: 108 MMHG | DIASTOLIC BLOOD PRESSURE: 62 MMHG

## 2020-04-14 DIAGNOSIS — N60.22: ICD-10-CM

## 2020-04-14 DIAGNOSIS — N60.12: Primary | ICD-10-CM

## 2020-04-14 NOTE — ROOPDOC
[facility] Report Of Operation


Report of Operation


DATE OF PROCEDURE: 4/14/20





PREPROCEDURE DIAGNOSES: Left breast mass at 1:00 and 6:00





POSTPROCEDURE DIAGNOSES: same





PROCEDURE: Ultrasound-guided biopsy of the margins at 1:00 and 6 with clip 

placement





SURGEON: Kiesha Lyons





ASSISTANT: 





ANESTHESIA: Local anesthetic was used.





ESTIMATED BLOOD LOSS: Approximately 1 mL. 





COMPLICATIONS: None. 





REMARKS: Postbiopsy mammogram showed clips in expected location.








DESCRIPTION OF PROCEDURE: 





Lidocaine 1% LOT [id number] Expiration 05/2023 


Sodium Bicarbonate 8.4% LOT  -EV Expiration 05/2021 


LEFT BREAST 1:00 


Hydromark clip LOT [id number] Expiration 12/2022   SHAPE 3


Bx device: BARD [id number] x10 cm LOT [facility] 2083 Expiration 01/2023  


LEFT BREAST 6:00


Hydromark clip LOT [id number] Expiration 11/2022 SHAPE 4   


Bx device: BARD [id number] x10 cm LOT EN 2083 Expiration 01/2023  








Informed consent was obtained. The most common risk and possible complications 

including bleeding, hematoma, bruising, infection, injury to surrounding 

structures were explained to the patient and she expressed understanding. 





Patient was taken to the procedure room and placed on the bed in the right 

lateral recumbent position with the left upper extremity placed along the body. 

Appropriate time out was done stating patients name, date of birth, and the 

procedure to be performed. The left breast was prepped and draped in the usual 

fashion. The ultrasound was used to confirm the location of the lesions in the 

left breast at 1:00 5 CFN and at 6:00 2CFN.  





The procedure was started with biopsy of 1:00 5CFN lesion. Plain Lidocaine 1% 

and 8.4% sodium bicarbonate 10:1 mix was used to numb the skin, the biopsy site 

and tissues along the anticipated biopsy tract. Small skin incision was made 

with blade number 11.  BARD Marquee 14G cannula with introducer ([id number]) was 

inserted through the incision and advanced under the ultrasound guidance to 

position immediately adjacent to the lesion. Next, the introducer was removed 

and BARD Marquee 14G biopsy device was places in the cannula. Pre-biopsy 

imaging, and post-biopsy imaging were captured. Five good core biopsies were 

taken at various levels of the lesion. Specimen was placed in formaldehyde, 

labeled with appropriate biopsy site and patients name, and sent to pathology 

for evaluation.





Next, the biopsy device was withdrawn and a clip introducer was inserted into 

the biopsy site via the cannula. The Hydromark clip was deployed under direct 

vision. Post-clip placement image was captured. 





Manual pressure over the biopsy cavity and tract was held after the clip 

introducer was withdrawn. No bleeding was noted upon removal of the pressure. 





Our attention was then turned to the lesion at 6:00 2 CFN. Plain Lidocaine 1% 

and 8.4% sodium bicarbonate 10:1 mix was used again to numb the skin, the biopsy

site and tissues along the anticipated biopsy tract. Small skin incision was 

made with blade number 11.  BARD Marquee 14G cannula with introducer ([id number]) 

was inserted through the incision and advanced under the ultrasound guidance to 

position immediately adjacent to the lesion. Next, the introducer was removed 

and BARD Marquee 14G biopsy device was places in the cannula. Pre-biopsy 

imaging, and post-biopsy imaging were captured. Five good core biopsies were 

taken at various levels of the lesion. Specimen was placed in formaldehyde, 

labeled with appropriate biopsy site and patients name, and sent to pathology 

for evaluation.





Next, the biopsy device was withdrawn and a clip introducer was inserted into 

the biopsy site via the cannula. The Hydromark clip was deployed under direct 

vision. Post-clip placement image was captured. 





Manual pressure over the biopsy cavity and tract was held after the clip 

introducer was withdrawn. No bleeding was noted upon removal of the pressure. 





Post-biopsy mammogram of the left breast was obtained and showed clips in 

expected position. Postprocedural dressing was placed.





Patient tolerated procedure well. Discharge instructions were discussed with the

patient and she expressed understanding.














KIESHA LYONS DO     Apr 14, 2020 21:21

## 2020-04-14 NOTE — REP
POSTBIOPSY MAMMOGRAM LEFT BREAST:

 

ML and CC views of the left breast are performed following ultrasound guided

biopsy of two areas in the left breast.  There is a metallic clip at the 1

o'clock position of the left breast following ultrasound guided biopsy of a

lesion at that location by Dr. Mike.  There is also a new metallic clip

within a nodular opacity at 6 o'clock left breast.  Again that lesion was

biopsied under ultrasound guidance by Dr. Mike.  There is an adjacent old

marking clip slightly superior and anterolateral to the new clip at the

peripheral margin of the nodular opacity.

## 2020-04-16 NOTE — REP
ULTRASOUND GUIDANCE FOR LEFT BREAST BIOPSY, TWO TARGETS:

 

Ultrasound guidance was provided for Dr. Mike who performed

ultrasound-guided biopsy of a lesion in the left breast at 1-o'clock position, 5

cm from the nipple and another lesion at 6-o'clock position, 1 cm from the

nipple.  Hydromark clip is placed at both locations.

## 2020-05-26 ENCOUNTER — HOSPITAL ENCOUNTER (OUTPATIENT)
Dept: HOSPITAL 53 - M PLALAB | Age: 50
End: 2020-05-26
Attending: FAMILY MEDICINE
Payer: COMMERCIAL

## 2020-05-26 ENCOUNTER — HOSPITAL ENCOUNTER (OUTPATIENT)
Dept: HOSPITAL 53 - M PLALAB | Age: 50
End: 2020-05-26
Attending: PHYSICIAN ASSISTANT
Payer: COMMERCIAL

## 2020-05-26 DIAGNOSIS — Z51.81: Primary | ICD-10-CM

## 2020-05-26 DIAGNOSIS — E55.9: ICD-10-CM

## 2020-05-26 DIAGNOSIS — M06.9: Primary | ICD-10-CM

## 2020-05-26 DIAGNOSIS — E78.5: ICD-10-CM

## 2020-05-26 DIAGNOSIS — Z79.899: ICD-10-CM

## 2020-05-26 DIAGNOSIS — M06.09: ICD-10-CM

## 2020-05-26 LAB
ALBUMIN SERPL BCG-MCNC: 3.5 GM/DL (ref 3.2–5.2)
ALT SERPL W P-5'-P-CCNC: 31 U/L (ref 12–78)
APPEARANCE UR: CLEAR
BACTERIA UR QL AUTO: NEGATIVE
BASOPHILS # BLD AUTO: 0 10^3/UL (ref 0–0.2)
BASOPHILS NFR BLD AUTO: 0.4 % (ref 0–1)
BILIRUB SERPL-MCNC: 0.3 MG/DL (ref 0.2–1)
BILIRUB UR QL STRIP.AUTO: NEGATIVE
BUN SERPL-MCNC: 14 MG/DL (ref 7–18)
CALCIUM SERPL-MCNC: 8.2 MG/DL (ref 8.5–10.1)
CHLORIDE SERPL-SCNC: 109 MEQ/L (ref 98–107)
CO2 SERPL-SCNC: 24 MEQ/L (ref 21–32)
CREAT SERPL-MCNC: 0.6 MG/DL (ref 0.55–1.3)
CREAT UR-MCNC: 48.1 MG/DL
CRP SERPL-MCNC: < 0.3 MG/DL (ref 0–0.3)
EOSINOPHIL # BLD AUTO: 0.2 10^3/UL (ref 0–0.5)
EOSINOPHIL NFR BLD AUTO: 2.4 % (ref 0–3)
ERYTHROCYTE [SEDIMENTATION RATE] IN BLOOD BY WESTERGREN METHOD: 12 MM/HR (ref 0–30)
EST. AVERAGE GLUCOSE BLD GHB EST-MCNC: 103 MG/DL (ref 60–110)
GFR SERPL CREATININE-BSD FRML MDRD: > 60 ML/MIN/{1.73_M2} (ref 51–?)
GLUCOSE SERPL-MCNC: 78 MG/DL (ref 70–100)
GLUCOSE UR QL STRIP.AUTO: NEGATIVE MG/DL
HCT VFR BLD AUTO: 36.4 % (ref 36–47)
HGB BLD-MCNC: 11.7 G/DL (ref 12–15.5)
HGB UR QL STRIP.AUTO: NEGATIVE
KETONES UR QL STRIP.AUTO: NEGATIVE MG/DL
LEUKOCYTE ESTERASE UR QL STRIP.AUTO: NEGATIVE
LYMPHOCYTES # BLD AUTO: 2.5 10^3/UL (ref 1.5–5)
LYMPHOCYTES NFR BLD AUTO: 29.7 % (ref 24–44)
MCH RBC QN AUTO: 29.2 PG (ref 27–33)
MCHC RBC AUTO-ENTMCNC: 32.1 G/DL (ref 32–36.5)
MCV RBC AUTO: 90.8 FL (ref 80–96)
MICROALBUMIN UR-MCNC: 6.1 MG/L
MICROALBUMIN/CREAT UR: 12.6 MCG/MG (ref 0–30)
MONOCYTES # BLD AUTO: 0.7 10^3/UL (ref 0–0.8)
MONOCYTES NFR BLD AUTO: 8.1 % (ref 0–5)
NEUTROPHILS # BLD AUTO: 5 10^3/UL (ref 1.5–8.5)
NEUTROPHILS NFR BLD AUTO: 59 % (ref 36–66)
NITRITE UR QL STRIP.AUTO: NEGATIVE
PH UR STRIP.AUTO: 5 UNITS (ref 5–9)
PLATELET # BLD AUTO: 357 10^3/UL (ref 150–450)
POTASSIUM SERPL-SCNC: 4.3 MEQ/L (ref 3.5–5.1)
PROT SERPL-MCNC: 6.9 GM/DL (ref 6.4–8.2)
PROT UR QL STRIP.AUTO: NEGATIVE MG/DL
RBC # BLD AUTO: 4.01 10^6/UL (ref 4–5.4)
RBC # UR AUTO: 0 /HPF (ref 0–3)
SODIUM SERPL-SCNC: 140 MEQ/L (ref 136–145)
SP GR UR STRIP.AUTO: 1.01 (ref 1–1.03)
SQUAMOUS #/AREA URNS AUTO: 1 /HPF (ref 0–6)
UROBILINOGEN UR QL STRIP.AUTO: 0.2 MG/DL (ref 0–2)
WBC # BLD AUTO: 8.4 10^3/UL (ref 4–10)
WBC #/AREA URNS AUTO: 0 /HPF (ref 0–3)

## 2020-05-28 LAB — INSULIN SERPL-ACNC: 46.6 UIU/ML (ref 2.6–24.9)

## 2020-12-04 ENCOUNTER — HOSPITAL ENCOUNTER (OUTPATIENT)
Dept: HOSPITAL 53 - M WHC | Age: 50
End: 2020-12-04
Attending: SURGERY
Payer: COMMERCIAL

## 2020-12-04 DIAGNOSIS — N63.20: Primary | ICD-10-CM

## 2020-12-04 DIAGNOSIS — Z97.8: ICD-10-CM

## 2020-12-04 PROCEDURE — 77065 DX MAMMO INCL CAD UNI: CPT

## 2020-12-04 PROCEDURE — 76642 ULTRASOUND BREAST LIMITED: CPT

## 2020-12-04 NOTE — REP
INDICATION:

8 MO F/U,LT BREAST MASS W/BENIGN BX; 8 MO F/U, LT BREAST MASS W/BENING BX.  Patient is

status post 2 recent ultrasound-guided needle biopsy procedures in the left breast.



COMPARISON:

Comparison mammography April 14, 2020, March 9, 2020, and March 2, 2020.



Comparison sonography April 7, 2020. Comparison sonography March 9, 2020.



TECHNIQUE:

Cc and MLO views of the left breast are obtained and 3D tomography is acquired in the

usual fashion.



FINDINGS:

Scattered fibroglandular elements are noted.  There are total of 3 needle biopsy

marker clips visible in the left breast.  Two of these are seen within a stable

inferomedial quadrant anterior 3rd left breast nodule.  This nodule measures 2.5 cm in

greatest diameter and is unchanged.  The other marker clip is in the upper outer

quadrant also unchanged in appearance and position.  No new nodule is seen.  No

architectural distortion or micro calcific patient is observed.



The Volpara volumetric breast density pattern is B.







Targeted sonography: Sonography imaging at the 1 o'clock position demonstrates a

HydroMARK cylindrical anechoic marker clip device in place at 1 o'clock just adjacent

to the hypoechoic structure which was biopsied.  This is unchanged.  There is still

some acoustic shadowing.



Scanning at approximately 6 o'clock, 1 cm from the nipple, there is a 2.7 x 2.3 x 1.1

cm predominantly solid oval-shaped lesion containing a HydroMARK clip device.  Is

previous dimensions are 2.6 x 2.7 x 1.0 cm.  There is no evidence of interval growth.



IMPRESSION:

BIRADS/ACR category 2 left breast mammogram and sonographic findings.



This patient's Tyrer-Cuzick lifetime breast cancer risk assessment score is 9.4%.



This mammogram was interpreted with the aid of an FDA-approved computer-aided

detection system.



RECOMMENDATION:

Repeat annual screening bilateral mammography..



The patient letter being requested is M2.





<Electronically signed by Satish Flowers > 12/04/20 [id number]

## 2021-02-24 ENCOUNTER — HOSPITAL ENCOUNTER (OUTPATIENT)
Dept: HOSPITAL 53 - M PLAIMG | Age: 51
End: 2021-02-24
Attending: PHYSICIAN ASSISTANT
Payer: COMMERCIAL

## 2021-02-24 ENCOUNTER — HOSPITAL ENCOUNTER (OUTPATIENT)
Dept: HOSPITAL 53 - M SFHCPLAZ | Age: 51
End: 2021-02-24
Attending: PHYSICIAN ASSISTANT
Payer: COMMERCIAL

## 2021-02-24 DIAGNOSIS — M25.561: Primary | ICD-10-CM

## 2021-02-24 DIAGNOSIS — J02.9: Primary | ICD-10-CM

## 2021-02-24 DIAGNOSIS — J03.90: Primary | ICD-10-CM

## 2021-02-24 LAB
ALBUMIN SERPL BCG-MCNC: 3.8 GM/DL (ref 3.2–5.2)
ALT SERPL W P-5'-P-CCNC: 34 U/L (ref 12–78)
BASOPHILS # BLD AUTO: 0 10^3/UL (ref 0–0.2)
BASOPHILS NFR BLD AUTO: 0.3 % (ref 0–1)
BILIRUB SERPL-MCNC: 0.3 MG/DL (ref 0.2–1)
BUN SERPL-MCNC: 12 MG/DL (ref 7–18)
CALCIUM SERPL-MCNC: 8.8 MG/DL (ref 8.5–10.1)
CHLORIDE SERPL-SCNC: 107 MEQ/L (ref 98–107)
CO2 SERPL-SCNC: 25 MEQ/L (ref 21–32)
CREAT SERPL-MCNC: 0.81 MG/DL (ref 0.55–1.3)
CRP SERPL-MCNC: 0.74 MG/DL (ref 0–0.3)
EOSINOPHIL # BLD AUTO: 0.1 10^3/UL (ref 0–0.5)
EOSINOPHIL NFR BLD AUTO: 0.6 % (ref 0–3)
ERYTHROCYTE [SEDIMENTATION RATE] IN BLOOD BY WESTERGREN METHOD: 19 MM/HR (ref 0–30)
GFR SERPL CREATININE-BSD FRML MDRD: > 60 ML/MIN/{1.73_M2} (ref 51–?)
GLUCOSE SERPL-MCNC: 86 MG/DL (ref 70–100)
HCT VFR BLD AUTO: 40.4 % (ref 36–47)
HETEROPH AB SER QL LA: NEGATIVE
HGB BLD-MCNC: 12.8 G/DL (ref 12–15.5)
LYMPHOCYTES # BLD AUTO: 1.2 10^3/UL (ref 1.5–5)
LYMPHOCYTES NFR BLD AUTO: 9.7 % (ref 24–44)
MCH RBC QN AUTO: 29.5 PG (ref 27–33)
MCHC RBC AUTO-ENTMCNC: 31.7 G/DL (ref 32–36.5)
MCV RBC AUTO: 93.1 FL (ref 80–96)
MONOCYTES # BLD AUTO: 1.5 10^3/UL (ref 0–0.8)
MONOCYTES NFR BLD AUTO: 12.4 % (ref 2–8)
NEUTROPHILS # BLD AUTO: 9.1 10^3/UL (ref 1.5–8.5)
NEUTROPHILS NFR BLD AUTO: 76.7 % (ref 36–66)
PLATELET # BLD AUTO: 315 10^3/UL (ref 150–450)
POTASSIUM SERPL-SCNC: 4.2 MEQ/L (ref 3.5–5.1)
PROT SERPL-MCNC: 7 GM/DL (ref 6.4–8.2)
RBC # BLD AUTO: 4.34 10^6/UL (ref 4–5.4)
SODIUM SERPL-SCNC: 138 MEQ/L (ref 136–145)
WBC # BLD AUTO: 11.8 10^3/UL (ref 4–10)

## 2021-02-24 NOTE — REPPI
INDICATION:

M25.561 ACUTE PAIN OF RIGHT KNEE



COMPARISON:

None.



TECHNIQUE:

Five views right knee.



FINDINGS:

There is no evidence of acute fracture, dislocation, or intrinsic bone disease.There

is mild medial joint space narrowing and subchondral sclerosis.  Mild lateral

patellofemoral joint space narrowing and subchondral sclerosis.  There is mild

spurring of the lateral patellar facet.  There is no significant joint effusion

identified.



IMPRESSION:

No fracture or dislocation. Mild degenerative changes.





<Electronically signed by Gerardo Nava > 02/24/21 [id number]

## 2021-03-19 ENCOUNTER — HOSPITAL ENCOUNTER (OUTPATIENT)
Dept: HOSPITAL 53 - M PLAIMG | Age: 51
End: 2021-03-19
Attending: FAMILY MEDICINE
Payer: COMMERCIAL

## 2021-03-19 DIAGNOSIS — J32.9: Primary | ICD-10-CM

## 2021-03-19 NOTE — REPPI
INDICATION:

J32.9 CHRONIC CONGESTION OF PARANASAL SINUS.



COMPARISON:

Comparison radiographs are from July 24, 2011..



TECHNIQUE:

Three views including water's, PA, and lateral views of the paranasal sinuses.



FINDINGS:

Frontal sinuses are clear.  Maxillary sinuses are clear.  There is no evidence of

ethmoid or sphenoid sinus opacification.  Mastoid aeration appears normal and

symmetric.  Bony orbital and paranasal sinus margins are intact.  No mandibular or

maxillary lesion is seen.



IMPRESSION:

No evidence of significant paranasal sinus disease.





<Electronically signed by Satish Flowers > 03/19/21 0934

## 2021-04-28 ENCOUNTER — HOSPITAL ENCOUNTER (OUTPATIENT)
Dept: HOSPITAL 53 - M SFHCWAGY | Age: 51
End: 2021-04-28
Attending: NURSE PRACTITIONER
Payer: COMMERCIAL

## 2021-04-28 DIAGNOSIS — Z11.3: Primary | ICD-10-CM

## 2021-05-07 ENCOUNTER — HOSPITAL ENCOUNTER (OUTPATIENT)
Dept: HOSPITAL 53 - M WHC | Age: 51
End: 2021-05-07
Attending: SURGERY
Payer: COMMERCIAL

## 2021-05-07 DIAGNOSIS — N63.24: ICD-10-CM

## 2021-05-07 DIAGNOSIS — N63.21: Primary | ICD-10-CM

## 2021-05-07 DIAGNOSIS — Z80.0: ICD-10-CM

## 2021-05-07 PROCEDURE — 77066 DX MAMMO INCL CAD BI: CPT

## 2021-05-07 NOTE — REPMRS
Patient History

Family history of colorectal cancer at age 50 or over in maternal

grandfather.

Benign US guided breast biopsy. of the left breast, April 14, 2020.  US Guided Breast Biopsy of the left breast, February 7, 2014.

No breast complaints today, Diagnostic due to benign US guided 

left breast biopsy April 14, 2020.

Patient has signed MRS History Sheet.

 

Diagnostic Bilateral Mammo: May 7, 2021 - Exam #: 

[id number]

Bilateral CC and MLO view(s) were taken.

 

Technologist: RT Jaleel

Prior study comparison: December 4, 2020, left breast diagnostic 

unilateral mammo performed at [facility].  April 14, 2020, bilateral diagnostic unilateral 

mammo performed at [facility].

 

FINDINGS: The breast tissue is heterogeneously dense.  This may 

lower the sensitivity of mammography.  Screening.

 

Digital screening (2D) mammography was performed bilaterally in 

the CC and MLO projections. Additionally, breast tomosynthesis 

(3D mammography) was performed bilaterally in the CC and MLO 

projections. Todays exam was compared to the prior exams(s).

 

By history, the patient has no complaints of a palpable breast 

abnormality or other significant breast complaints.

 

The breasts are unchanged in size and shape. Once again, dense 

heterogenous fibroglandular elements are seen bilaterally in a 

stable appearing pattern but to such a degree that the 

sensitivity of the mammogram in detecting cancer is 

decreased.There are no juan c-soft tissue densities or spiculated 

masses. There is no internal architectural distortion.Once again,

stable benign appearing calcifications are seen. There are no 

suspicious juan c-calcific clusters. Skin thickening or nipple 

retraction is not present.

 

IMPRESSION:

 

BI-RADS Category 2- Benign Findings(s). There is no evidence of 

malignant alteration of the breasts. Followup examination 

recommended in one year.

The Volpara volumetric breast density category is C, the breasts 

are heterogenously dense which may obscure small masses.

This mammogram was read with the assistance of iCAD PowerLook 

AMP,an FDA approved computer aided detection system for 

mammography.

 

Negative x-ray reports should not delay surgical consultation if 

a dominant or clinically suspicious mass is present.

The lifetime Tyrer-Cuzick score is    9.4  %

Not all breast cancers can be identified by mammography. 

Therefore, we recommend that you continue to perform regular 

breast self-examination and physical examination and then 

promptly contact your physician of any concerns or changes.

 

Adenosis and dense breasts may obscure an underlying neoplasm.

 

Assessment: BI-RADS/ACR category 2 mammogram. Benign Findings.

 

Recommendation

Routine screening mammogram of both breasts in 1 year.

 

Electronically Signed By: Huy Orozco DO 05/07/21 0496

## 2021-05-10 ENCOUNTER — HOSPITAL ENCOUNTER (OUTPATIENT)
Dept: HOSPITAL 53 - M WHC | Age: 51
End: 2021-05-10
Attending: SURGERY
Payer: COMMERCIAL

## 2021-05-10 DIAGNOSIS — N63.21: Primary | ICD-10-CM

## 2021-05-10 DIAGNOSIS — N63.24: ICD-10-CM

## 2021-05-11 NOTE — REP
INDICATION:

N63.21,N63.24, 2 LT BREAST LUMPS.



COMPARISON:

10/04/2020



TECHNIQUE:

Ultrasonographic evaluation at the 1 o'clock and 6 o'clock positions over previously

biopsied sites.  Assess for changes.



FINDINGS:

There is no change in appearance of the ultrasound.  There is no change in the clip

placement.  One is seen at 1 o'clock and the other is again seen at 6 o'clock



IMPRESSION:

No change from the prior ultrasound examination.  ACR category 2.  Sign





<Electronically signed by Huy Orozco > 05/11/21 [id number]

## 2021-11-18 ENCOUNTER — HOSPITAL ENCOUNTER (OUTPATIENT)
Dept: HOSPITAL 53 - M PLALAB | Age: 51
End: 2021-11-18
Attending: FAMILY MEDICINE
Payer: COMMERCIAL

## 2021-11-18 DIAGNOSIS — E55.9: ICD-10-CM

## 2021-11-18 DIAGNOSIS — R73.01: Primary | ICD-10-CM

## 2021-11-18 LAB
25(OH)D3 SERPL-MCNC: 58.8 NG/ML (ref 30–100)
ALBUMIN SERPL BCG-MCNC: 3.5 GM/DL (ref 3.2–5.2)
ALT SERPL W P-5'-P-CCNC: 25 U/L (ref 12–78)
BILIRUB SERPL-MCNC: 0.3 MG/DL (ref 0.2–1)
BUN SERPL-MCNC: 12 MG/DL (ref 7–18)
CALCIUM SERPL-MCNC: 8.6 MG/DL (ref 8.5–10.1)
CHLORIDE SERPL-SCNC: 108 MEQ/L (ref 98–107)
CHOLEST SERPL-MCNC: 201 MG/DL (ref ?–200)
CHOLEST/HDLC SERPL: 4.28 {RATIO} (ref ?–5)
CO2 SERPL-SCNC: 28 MEQ/L (ref 21–32)
CREAT SERPL-MCNC: 0.73 MG/DL (ref 0.55–1.3)
CREAT UR-MCNC: 234 MG/DL
CRP SERPL-MCNC: 0.3 MG/DL (ref 0–0.3)
EST. AVERAGE GLUCOSE BLD GHB EST-MCNC: 103 MG/DL (ref 60–110)
GFR SERPL CREATININE-BSD FRML MDRD: > 60 ML/MIN/{1.73_M2} (ref 51–?)
GLUCOSE SERPL-MCNC: 90 MG/DL (ref 70–100)
HDLC SERPL-MCNC: 47 MG/DL (ref 40–?)
LDLC SERPL CALC-MCNC: 110 MG/DL (ref ?–100)
MICROALBUMIN UR-MCNC: 15.5 MG/L
MICROALBUMIN/CREAT UR: 6.6 MCG/MG (ref 0–30)
NONHDLC SERPL-MCNC: 154 MG/DL
POTASSIUM SERPL-SCNC: 4.4 MEQ/L (ref 3.5–5.1)
PROT SERPL-MCNC: 6.7 GM/DL (ref 6.4–8.2)
PTH-INTACT SERPL-MCNC: 35.4 PG/ML (ref 18.5–88)
SODIUM SERPL-SCNC: 140 MEQ/L (ref 136–145)
T4 FREE SERPL-MCNC: 1.05 NG/DL (ref 0.76–1.46)
TRIGL SERPL-MCNC: 218 MG/DL (ref ?–150)
TSH SERPL DL<=0.005 MIU/L-ACNC: 1.57 UIU/ML (ref 0.36–3.74)

## 2021-11-24 ENCOUNTER — HOSPITAL ENCOUNTER (OUTPATIENT)
Dept: HOSPITAL 53 - M RAD | Age: 51
End: 2021-11-24
Attending: FAMILY MEDICINE
Payer: COMMERCIAL

## 2021-11-24 DIAGNOSIS — N13.30: Primary | ICD-10-CM

## 2021-11-24 PROCEDURE — 76775 US EXAM ABDO BACK WALL LIM: CPT

## 2021-11-24 PROCEDURE — 78707 K FLOW/FUNCT IMAGE W/O DRUG: CPT

## 2021-11-24 NOTE — REP
INDICATION:

HYDRONEPHROSIS OF RT KIDNEY/ US 1ST, NM 2ND



COMPARISON:

None



TECHNIQUE:

Real time gray scale ultrasound examination using curved array transducer.



FINDINGS:

Right kidney measures 11.4 x 5.5 x 3.7 cm and demonstrates mild hydronephrosis and

proximal hydroureter.  No obvious nephrolithiasis, cystic or renal mass lesion

appreciated.



Left kidney measures 11.2 x 5.3 x 5.0 cm without hydronephrosis, nephrolithiasis,

cystic or renal mass lesion.



Bladder is partially distended and grossly normal in appearance.  Ureteral jets were

not identified bilaterally.



IMPRESSION:

1. Mild right hydronephrosis.  Findings were also described on abdominal ultrasound

dated 02/16/2018 and may represent chronic change.





<Electronically signed by Rui Ashraf > 11/24/21 [id number]

## 2022-02-16 ENCOUNTER — HOSPITAL ENCOUNTER (OUTPATIENT)
Dept: HOSPITAL 53 - M RAD | Age: 52
End: 2022-02-16
Attending: PHYSICIAN ASSISTANT
Payer: COMMERCIAL

## 2022-02-16 ENCOUNTER — HOSPITAL ENCOUNTER (OUTPATIENT)
Dept: HOSPITAL 53 - M SFHCPLAZ | Age: 52
End: 2022-02-16
Attending: PHYSICIAN ASSISTANT
Payer: COMMERCIAL

## 2022-02-16 DIAGNOSIS — M54.50: Primary | ICD-10-CM

## 2022-02-16 DIAGNOSIS — R31.9: ICD-10-CM

## 2022-02-16 DIAGNOSIS — Z87.448: ICD-10-CM

## 2022-02-16 LAB
ALBUMIN SERPL BCG-MCNC: 3.7 GM/DL (ref 3.2–5.2)
APPEARANCE UR: CLEAR
BACTERIA UR QL AUTO: NEGATIVE
BASOPHILS # BLD AUTO: 0.1 10^3/UL (ref 0–0.2)
BASOPHILS NFR BLD AUTO: 0.8 % (ref 0–1)
BILIRUB UR QL STRIP.AUTO: NEGATIVE
BUN SERPL-MCNC: 12 MG/DL (ref 7–18)
CALCIUM SERPL-MCNC: 9 MG/DL (ref 8.5–10.1)
CHLORIDE SERPL-SCNC: 105 MEQ/L (ref 98–107)
CO2 SERPL-SCNC: 30 MEQ/L (ref 21–32)
CREAT SERPL-MCNC: 0.72 MG/DL (ref 0.55–1.3)
EOSINOPHIL # BLD AUTO: 0.2 10^3/UL (ref 0–0.5)
EOSINOPHIL NFR BLD AUTO: 2.8 % (ref 0–3)
GFR SERPL CREATININE-BSD FRML MDRD: > 60 ML/MIN/{1.73_M2} (ref 51–?)
GLUCOSE SERPL-MCNC: 87 MG/DL (ref 70–100)
GLUCOSE UR QL STRIP.AUTO: NEGATIVE MG/DL
HCT VFR BLD AUTO: 37.8 % (ref 36–47)
HGB BLD-MCNC: 12.5 G/DL (ref 12–15.5)
HGB UR QL STRIP.AUTO: (no result)
KETONES UR QL STRIP.AUTO: NEGATIVE MG/DL
LEUKOCYTE ESTERASE UR QL STRIP.AUTO: NEGATIVE
LYMPHOCYTES # BLD AUTO: 2.3 10^3/UL (ref 1.5–5)
LYMPHOCYTES NFR BLD AUTO: 31 % (ref 24–44)
MCH RBC QN AUTO: 30 PG (ref 27–33)
MCHC RBC AUTO-ENTMCNC: 33.1 G/DL (ref 32–36.5)
MCV RBC AUTO: 90.9 FL (ref 80–96)
MONOCYTES # BLD AUTO: 0.8 10^3/UL (ref 0–0.8)
MONOCYTES NFR BLD AUTO: 10.1 % (ref 2–8)
MUCOUS THREADS URNS QL MICRO: (no result)
NEUTROPHILS # BLD AUTO: 4.1 10^3/UL (ref 1.5–8.5)
NEUTROPHILS NFR BLD AUTO: 55 % (ref 36–66)
NITRITE UR QL STRIP.AUTO: NEGATIVE
PH UR STRIP.AUTO: 6 UNITS (ref 5–9)
PHOSPHATE SERPL-MCNC: 3.3 MG/DL (ref 2.5–4.9)
PLATELET # BLD AUTO: 352 10^3/UL (ref 150–450)
POTASSIUM SERPL-SCNC: 4.2 MEQ/L (ref 3.5–5.1)
PROT UR QL STRIP.AUTO: NEGATIVE MG/DL
RBC # BLD AUTO: 4.16 10^6/UL (ref 4–5.4)
RBC # UR AUTO: 0 /HPF (ref 0–3)
SODIUM SERPL-SCNC: 140 MEQ/L (ref 136–145)
SP GR UR STRIP.AUTO: 1.01 (ref 1–1.03)
SQUAMOUS #/AREA URNS AUTO: 1 /HPF (ref 0–6)
UROBILINOGEN UR QL STRIP.AUTO: 0.2 MG/DL (ref 0–2)
WBC # BLD AUTO: 7.4 10^3/UL (ref 4–10)
WBC #/AREA URNS AUTO: 2 /HPF (ref 0–3)

## 2022-12-27 ENCOUNTER — HOSPITAL ENCOUNTER (OUTPATIENT)
Dept: HOSPITAL 53 - M SFHCPLAZ | Age: 52
End: 2022-12-27
Attending: FAMILY MEDICINE
Payer: COMMERCIAL

## 2022-12-27 DIAGNOSIS — N39.0: Primary | ICD-10-CM

## 2022-12-27 LAB
AMORPH SED URNS QL MICRO: (no result)
APPEARANCE UR: (no result)
BACTERIA URNS QL MICRO: (no result)
BILIRUB UR QL STRIP: NEGATIVE
COLOR UR: (no result)
GLUCOSE UR STRIP-MCNC: NEGATIVE MG/DL
HGB UR QL STRIP: POSITIVE
HYALINE CASTS URNS QL MICRO: (no result) /LPF (ref 0–1)
KETONES UR QL STRIP: (no result) MG/DL
LEUKOCYTE ESTERASE UR QL STRIP: POSITIVE
NITRITE UR QL STRIP: NEGATIVE
PH UR STRIP: 5 UNITS (ref 5–7)
PROT UR STRIP-MCNC: (no result) MG/DL
RBC #/AREA URNS HPF: (no result) /HPF (ref 0–3)
SP GR UR STRIP: 1.02 (ref 1–1.03)
SQUAMOUS URNS QL MICRO: (no result) /HPF
UROBILINOGEN UR QL STRIP: NORMAL MG/DL
WBC #/AREA URNS HPF: (no result) /HPF (ref 0–3)

## 2023-01-05 ENCOUNTER — HOSPITAL ENCOUNTER (OUTPATIENT)
Dept: HOSPITAL 53 - M PLALAB | Age: 53
End: 2023-01-05
Attending: FAMILY MEDICINE
Payer: COMMERCIAL

## 2023-01-05 DIAGNOSIS — R73.01: Primary | ICD-10-CM

## 2023-01-05 DIAGNOSIS — E55.9: ICD-10-CM

## 2023-01-05 DIAGNOSIS — E78.5: ICD-10-CM

## 2023-01-05 LAB
25(OH)D3 SERPL-MCNC: 47.3 NG/ML (ref 20–100)
ALBUMIN SERPL BCG-MCNC: 3.6 G/DL (ref 3.2–5.2)
ALP SERPL-CCNC: 91 U/L (ref 46–116)
ALT SERPL W P-5'-P-CCNC: 24 U/L (ref 7–40)
AST SERPL-CCNC: 24 U/L (ref ?–34)
BILIRUB SERPL-MCNC: 0.4 MG/DL (ref 0.3–1.2)
BUN SERPL-MCNC: 11 MG/DL (ref 9–23)
CALCIUM SERPL-MCNC: 8.7 MG/DL (ref 8.5–10.1)
CHLORIDE SERPL-SCNC: 105 MMOL/L (ref 98–107)
CHOLEST SERPL-MCNC: 185 MG/DL (ref ?–200)
CHOLEST/HDLC SERPL: 3.87 {RATIO} (ref ?–5)
CO2 SERPL-SCNC: 26 MMOL/L (ref 20–31)
CREAT SERPL-MCNC: 0.75 MG/DL (ref 0.55–1.3)
EST. AVERAGE GLUCOSE BLD GHB EST-MCNC: 97 MG/DL (ref 60–110)
GFR SERPL CREATININE-BSD FRML MDRD: > 60 ML/MIN/{1.73_M2} (ref 51–?)
GLUCOSE SERPL-MCNC: 80 MG/DL (ref 60–100)
HDLC SERPL-MCNC: 47.8 MG/DL (ref 40–?)
LDLC SERPL CALC-MCNC: 87.6 MG/DL (ref ?–100)
NONHDLC SERPL-MCNC: 137 MG/DL
POTASSIUM SERPL-SCNC: 4.4 MMOL/L (ref 3.5–5.1)
PROT SERPL-MCNC: 6.6 G/DL (ref 5.7–8.2)
PTH-INTACT SERPL-MCNC: 43.3 PG/ML (ref 18.5–88)
SODIUM SERPL-SCNC: 139 MMOL/L (ref 136–145)
T4 FREE SERPL-MCNC: 1.01 NG/DL (ref 0.89–1.76)
TRIGL SERPL-MCNC: 248 MG/DL (ref ?–150)
TSH SERPL DL<=0.005 MIU/L-ACNC: 2.16 UIU/ML (ref 0.55–4.78)

## 2023-01-06 LAB
APO A-I SERPL-MCNC: 166 MG/DL (ref 116–209)
APO B SERPL-MCNC: 109 MG/DL (ref ?–90)
APO B/APO A-I SERPL: 0.7 RATIO (ref 0–0.6)
INSULIN SERPL-ACNC: 20 UIU/ML (ref 2.6–24.9)

## 2023-04-08 ENCOUNTER — HOSPITAL ENCOUNTER (OUTPATIENT)
Dept: HOSPITAL 53 - M WUC | Age: 53
End: 2023-04-08
Attending: STUDENT IN AN ORGANIZED HEALTH CARE EDUCATION/TRAINING PROGRAM
Payer: COMMERCIAL

## 2023-04-08 DIAGNOSIS — R30.0: Primary | ICD-10-CM

## 2023-04-18 ENCOUNTER — HOSPITAL ENCOUNTER (OUTPATIENT)
Dept: HOSPITAL 53 - M PLAIMG | Age: 53
End: 2023-04-18
Attending: PHYSICIAN ASSISTANT
Payer: COMMERCIAL
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
## 2023-04-18 DIAGNOSIS — M47.817: ICD-10-CM
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
## 2023-04-18 DIAGNOSIS — M47.816: ICD-10-CM

## 2023-04-18 DIAGNOSIS — S39.012A: Primary | ICD-10-CM

## 2023-04-25 ENCOUNTER — HOSPITAL ENCOUNTER (OUTPATIENT)
Dept: HOSPITAL 53 - M PLAIMG | Age: 53
End: 2023-04-25
Attending: NURSE PRACTITIONER
Payer: COMMERCIAL

## 2023-04-25 DIAGNOSIS — R76.11: Primary | ICD-10-CM

## 2023-05-08 ENCOUNTER — HOSPITAL ENCOUNTER (OUTPATIENT)
Dept: HOSPITAL 53 - M PLALAB | Age: 53
End: 2023-05-08
Attending: INTERNAL MEDICINE
Payer: COMMERCIAL

## 2023-05-08 DIAGNOSIS — M06.9: Primary | ICD-10-CM

## 2023-07-19 ENCOUNTER — HOSPITAL ENCOUNTER (OUTPATIENT)
Dept: HOSPITAL 53 - M WHC | Age: 53
End: 2023-07-19
Payer: COMMERCIAL

## 2023-07-19 DIAGNOSIS — D25.1: ICD-10-CM

## 2023-07-19 DIAGNOSIS — N94.6: Primary | ICD-10-CM

## 2023-08-03 ENCOUNTER — HOSPITAL ENCOUNTER (OUTPATIENT)
Dept: HOSPITAL 53 - M WHC | Age: 53
End: 2023-08-03
Payer: COMMERCIAL

## 2023-08-03 ENCOUNTER — HOSPITAL ENCOUNTER (OUTPATIENT)
Dept: HOSPITAL 53 - M SFHCWAGY | Age: 53
End: 2023-08-03
Payer: COMMERCIAL

## 2023-08-03 DIAGNOSIS — Z12.31: Primary | ICD-10-CM

## 2023-08-03 DIAGNOSIS — Z12.4: Primary | ICD-10-CM

## 2023-08-03 PROCEDURE — 87624 HPV HI-RISK TYP POOLED RSLT: CPT

## 2023-10-05 ENCOUNTER — HOSPITAL ENCOUNTER (OUTPATIENT)
Dept: HOSPITAL 53 - M SFHCPLAZ | Age: 53
End: 2023-10-05
Attending: FAMILY MEDICINE
Payer: COMMERCIAL

## 2023-10-05 DIAGNOSIS — Z53.9: Primary | ICD-10-CM

## 2023-10-09 NOTE — REP
Completed     Surgeon Consult 1/9/23  Seminar 1/5/23  Sleep Medicine-compliant CPAP  Physical Therapy-completed 2/15/23  Psych-cleared 3/28/23  RD cleared 10/6/23  Support Group-8/8/23     Incomplete     5% Weight Loss     Surgeon Consult weight   272 lbs  Current Weight   266 lbs             Attendance     3 allowable No call/late cancel (including technincal difficulties) appointments in bariatric program before step-away x 3 months     #1  #2  #3   INDICATION:

HYDRONEPHROSIS OF RT KIDNEY/ US 1ST, NM 2ND.



COMPARISON:

09/13/2018 which was within normal limits



TECHNIQUE/RADIOTRACER AND DOSE:

After the intravenous administration of 8.8 mCi of technetium 99 M Mag 3 a renal flow

in scan was obtained



FINDINGS:

The renal flow portion of the examination shows prompt and symmetric diffuse

distribution of the radiotracer throughout the kidneys.



The dynamic renal scintiscan shows good renal cortical washout characteristics with

delayed activity resolution in the right renal collecting system



Pre and postvoid scintigraphy shows no significant change in the increased activity

seen in the right renal collecting system compared to the left.



Analysis of the functional renogram curves shows time to peak activity for the left

kidney of 2 minutes and time to peak activity for the right kidney of 2 minutes.

These values are normal.  The T 1/2 value on the left is 8.8 minutes and on the right

13.8 minutes.  This is minimally delayed on the right.  Count density data analysis

shows 51.8% of the counts coming from the left kidney and 48.2% of the counts coming

from the right kidney.



IMPRESSION:

1. There is evidence of delayed excretion of the radiotracer on the right.  This could

result in a slight prolongation of the T 1/2 value.  The exact etiology of this is

uncertain.  When compared to the 09/13/2018 exam does not appear to be significantly

changed.  Does the patient have a functional UPJ configuration on the right?





<Electronically signed by Huy Orozco > 11/24/21 [id number]

## 2023-11-06 ENCOUNTER — HOSPITAL ENCOUNTER (OUTPATIENT)
Dept: HOSPITAL 53 - M PLALAB | Age: 53
End: 2023-11-06
Attending: FAMILY MEDICINE
Payer: COMMERCIAL

## 2023-11-06 DIAGNOSIS — E55.9: ICD-10-CM

## 2023-11-06 DIAGNOSIS — E78.5: ICD-10-CM

## 2023-11-06 DIAGNOSIS — R73.01: Primary | ICD-10-CM

## 2023-11-06 LAB
25(OH)D3 SERPL-MCNC: 51.5 NG/ML (ref 20–100)
ALBUMIN SERPL BCG-MCNC: 3.6 G/DL (ref 3.2–5.2)
ALP SERPL-CCNC: 82 U/L (ref 46–116)
ALT SERPL W P-5'-P-CCNC: 22 U/L (ref 7–40)
AST SERPL-CCNC: 20 U/L (ref ?–34)
BILIRUB SERPL-MCNC: 0.6 MG/DL (ref 0.3–1.2)
BUN SERPL-MCNC: 15 MG/DL (ref 9–23)
CALCIUM SERPL-MCNC: 8.6 MG/DL (ref 8.5–10.1)
CHLORIDE SERPL-SCNC: 107 MMOL/L (ref 98–107)
CHOLEST SERPL-MCNC: 208 MG/DL (ref ?–200)
CHOLEST/HDLC SERPL: 3.75 {RATIO} (ref ?–5)
CO2 SERPL-SCNC: 24 MMOL/L (ref 20–31)
CREAT SERPL-MCNC: 0.77 MG/DL (ref 0.55–1.3)
CREAT UR-MCNC: 145.2 MG/DL
EST. AVERAGE GLUCOSE BLD GHB EST-MCNC: 97 MG/DL (ref 60–110)
GFR SERPL CREATININE-BSD FRML MDRD: > 60 ML/MIN/{1.73_M2} (ref 51–?)
GLUCOSE SERPL-MCNC: 96 MG/DL (ref 60–100)
HDLC SERPL-MCNC: 55.4 MG/DL (ref 40–?)
LDLC SERPL CALC-MCNC: 124 MG/DL (ref ?–100)
MICROALBUMIN UR-MCNC: 5 MG/L
MICROALBUMIN/CREAT UR: 3.4 MCG/MG (ref 0–30)
NONHDLC SERPL-MCNC: 152.6 MG/DL
POTASSIUM SERPL-SCNC: 4.5 MMOL/L (ref 3.5–5.1)
PROT SERPL-MCNC: 6.7 G/DL (ref 5.7–8.2)
PTH-INTACT SERPL-MCNC: 32.5 PG/ML (ref 18.5–88)
SODIUM SERPL-SCNC: 141 MMOL/L (ref 136–145)
T4 FREE SERPL-MCNC: 0.96 NG/DL (ref 0.89–1.76)
TRIGL SERPL-MCNC: 143 MG/DL (ref ?–150)
TSH SERPL DL<=0.005 MIU/L-ACNC: 1.96 UIU/ML (ref 0.55–4.78)

## 2024-02-07 ENCOUNTER — HOSPITAL ENCOUNTER (OUTPATIENT)
Dept: HOSPITAL 53 - M SFHCPLAZ | Age: 54
End: 2024-02-07
Attending: PHYSICIAN ASSISTANT
Payer: COMMERCIAL

## 2024-02-07 DIAGNOSIS — J06.9: Primary | ICD-10-CM

## 2024-04-16 ENCOUNTER — HOSPITAL ENCOUNTER (OUTPATIENT)
Dept: HOSPITAL 53 - M PLALAB | Age: 54
End: 2024-04-16
Attending: FAMILY MEDICINE
Payer: COMMERCIAL

## 2024-04-16 DIAGNOSIS — R19.6: ICD-10-CM

## 2024-04-16 DIAGNOSIS — R73.01: Primary | ICD-10-CM

## 2024-04-16 LAB
ALBUMIN SERPL BCG-MCNC: 3.5 G/DL (ref 3.2–5.2)
ALP SERPL-CCNC: 86 U/L (ref 46–116)
ALT SERPL W P-5'-P-CCNC: 19 U/L (ref 7–40)
AST SERPL-CCNC: 19 U/L (ref ?–34)
BASOPHILS # BLD AUTO: 0 10^3/UL (ref 0–0.2)
BASOPHILS NFR BLD AUTO: 0.5 % (ref 0–1)
BILIRUB SERPL-MCNC: 0.6 MG/DL (ref 0.3–1.2)
BUN SERPL-MCNC: 15 MG/DL (ref 9–23)
CALCIUM SERPL-MCNC: 9 MG/DL (ref 8.5–10.1)
CHLORIDE SERPL-SCNC: 108 MMOL/L (ref 98–107)
CO2 SERPL-SCNC: 25 MMOL/L (ref 20–31)
CREAT SERPL-MCNC: 0.75 MG/DL (ref 0.55–1.3)
EOSINOPHIL # BLD AUTO: 0.1 10^3/UL (ref 0–0.5)
EOSINOPHIL NFR BLD AUTO: 2 % (ref 0–3)
FERRITIN SERPL-MCNC: 19.4 NG/ML (ref 7.3–270.7)
GFR SERPL CREATININE-BSD FRML MDRD: > 60 ML/MIN/{1.73_M2} (ref 51–?)
GLUCOSE SERPL-MCNC: 92 MG/DL (ref 60–100)
HCT VFR BLD AUTO: 42 % (ref 36–47)
HGB BLD-MCNC: 13.7 G/DL (ref 12–15.5)
LYMPHOCYTES # BLD AUTO: 1.7 10^3/UL (ref 1.5–5)
LYMPHOCYTES NFR BLD AUTO: 25.5 % (ref 24–44)
MCH RBC QN AUTO: 30.9 PG (ref 27–33)
MCHC RBC AUTO-ENTMCNC: 32.6 G/DL (ref 32–36.5)
MCV RBC AUTO: 94.6 FL (ref 80–96)
MONOCYTES # BLD AUTO: 0.6 10^3/UL (ref 0–0.8)
MONOCYTES NFR BLD AUTO: 9 % (ref 2–8)
NEUTROPHILS # BLD AUTO: 4.1 10^3/UL (ref 1.5–8.5)
NEUTROPHILS NFR BLD AUTO: 62.7 % (ref 36–66)
PLATELET # BLD AUTO: 334 10^3/UL (ref 150–450)
POTASSIUM SERPL-SCNC: 4.5 MMOL/L (ref 3.5–5.1)
PROT SERPL-MCNC: 6.6 G/DL (ref 5.7–8.2)
RBC # BLD AUTO: 4.44 10^6/UL (ref 4–5.4)
SODIUM SERPL-SCNC: 139 MMOL/L (ref 136–145)
VIT B12 SERPL-MCNC: 843 PG/ML (ref 211–911)
WBC # BLD AUTO: 6.5 10^3/UL (ref 4–10)

## 2024-04-18 LAB
INSULIN SERPL-ACNC: 18.8 UIU/ML (ref 2.6–24.9)
ZINC SERPL-MCNC: 90 UG/DL (ref 44–115)

## 2024-05-10 ENCOUNTER — HOSPITAL ENCOUNTER (OUTPATIENT)
Dept: HOSPITAL 53 - M RAD | Age: 54
End: 2024-05-10
Attending: FAMILY MEDICINE
Payer: COMMERCIAL

## 2024-05-10 DIAGNOSIS — R19.6: Primary | ICD-10-CM

## 2024-05-17 ENCOUNTER — HOSPITAL ENCOUNTER (OUTPATIENT)
Dept: HOSPITAL 53 - M SFHCPLAZ | Age: 54
End: 2024-05-17
Attending: FAMILY MEDICINE
Payer: COMMERCIAL

## 2024-05-17 DIAGNOSIS — R19.6: ICD-10-CM

## 2024-05-17 DIAGNOSIS — R73.01: Primary | ICD-10-CM

## 2024-05-17 DIAGNOSIS — Z80.0: ICD-10-CM

## 2024-08-28 ENCOUNTER — HOSPITAL ENCOUNTER (OUTPATIENT)
Dept: HOSPITAL 53 - M PLALAB | Age: 54
End: 2024-08-28
Attending: FAMILY MEDICINE
Payer: COMMERCIAL

## 2024-08-28 DIAGNOSIS — R19.6: ICD-10-CM

## 2024-08-28 DIAGNOSIS — D50.9: ICD-10-CM

## 2024-08-28 DIAGNOSIS — R73.01: Primary | ICD-10-CM

## 2024-08-28 LAB
ALBUMIN SERPL BCG-MCNC: 3.8 G/DL (ref 3.2–5.2)
ALP SERPL-CCNC: 87 U/L (ref 46–116)
ALT SERPL W P-5'-P-CCNC: 29 U/L (ref 7–40)
AST SERPL-CCNC: 22 U/L (ref ?–34)
BASOPHILS # BLD AUTO: 0 10^3/UL (ref 0–0.2)
BASOPHILS NFR BLD AUTO: 0.6 % (ref 0–1)
BILIRUB SERPL-MCNC: 0.6 MG/DL (ref 0.3–1.2)
BUN SERPL-MCNC: 13 MG/DL (ref 9–23)
CALCIUM SERPL-MCNC: 8.8 MG/DL (ref 8.5–10.1)
CHLORIDE SERPL-SCNC: 109 MMOL/L (ref 98–107)
CO2 SERPL-SCNC: 22 MMOL/L (ref 20–31)
CREAT SERPL-MCNC: 0.7 MG/DL (ref 0.55–1.3)
EOSINOPHIL # BLD AUTO: 0.2 10^3/UL (ref 0–0.5)
EOSINOPHIL NFR BLD AUTO: 3.6 % (ref 0–3)
FERRITIN SERPL-MCNC: 28.1 NG/ML (ref 7.3–270.7)
GFR SERPL CREATININE-BSD FRML MDRD: > 60 ML/MIN/{1.73_M2} (ref 51–?)
GLUCOSE SERPL-MCNC: 85 MG/DL (ref 60–100)
HCT VFR BLD AUTO: 43.5 % (ref 36–47)
HGB BLD-MCNC: 14.3 G/DL (ref 12–15.5)
LYMPHOCYTES # BLD AUTO: 1.8 10^3/UL (ref 1.5–5)
LYMPHOCYTES NFR BLD AUTO: 27.1 % (ref 24–44)
MCH RBC QN AUTO: 31.3 PG (ref 27–33)
MCHC RBC AUTO-ENTMCNC: 32.9 G/DL (ref 32–36.5)
MCV RBC AUTO: 95.2 FL (ref 80–96)
MONOCYTES # BLD AUTO: 0.7 10^3/UL (ref 0–0.8)
MONOCYTES NFR BLD AUTO: 9.7 % (ref 2–8)
NEUTROPHILS # BLD AUTO: 3.9 10^3/UL (ref 1.5–8.5)
NEUTROPHILS NFR BLD AUTO: 58.7 % (ref 36–66)
PLATELET # BLD AUTO: 318 10^3/UL (ref 150–450)
POTASSIUM SERPL-SCNC: 4.2 MMOL/L (ref 3.5–5.1)
PROT SERPL-MCNC: 6.8 G/DL (ref 5.7–8.2)
RBC # BLD AUTO: 4.57 10^6/UL (ref 4–5.4)
SODIUM SERPL-SCNC: 137 MMOL/L (ref 136–145)
VIT B12 SERPL-MCNC: 838 PG/ML (ref 211–911)
WBC # BLD AUTO: 6.7 10^3/UL (ref 4–10)

## 2024-08-29 LAB — INSULIN SERPL-ACNC: 21.7 UIU/ML (ref ?–18.4)

## 2024-08-31 LAB — ZINC SERPL-MCNC: 138 MCG/DL (ref 60–130)

## 2024-09-18 ENCOUNTER — HOSPITAL ENCOUNTER (OUTPATIENT)
Dept: HOSPITAL 53 - M WHC | Age: 54
End: 2024-09-18
Attending: FAMILY MEDICINE
Payer: COMMERCIAL

## 2024-09-18 ENCOUNTER — HOSPITAL ENCOUNTER (OUTPATIENT)
Dept: HOSPITAL 53 - M SFHCWAGY | Age: 54
End: 2024-09-18
Payer: COMMERCIAL

## 2024-09-18 DIAGNOSIS — Z12.31: Primary | ICD-10-CM

## 2024-09-18 DIAGNOSIS — R92.323: ICD-10-CM

## 2024-09-18 DIAGNOSIS — N73.9: Primary | ICD-10-CM

## 2024-11-15 ENCOUNTER — HOSPITAL ENCOUNTER (OUTPATIENT)
Dept: HOSPITAL 53 - M SFHCWAGY | Age: 54
End: 2024-11-15
Attending: OBSTETRICS & GYNECOLOGY
Payer: COMMERCIAL

## 2024-11-15 DIAGNOSIS — N92.0: Primary | ICD-10-CM

## 2024-12-17 ENCOUNTER — HOSPITAL ENCOUNTER (OUTPATIENT)
Dept: HOSPITAL 53 - M SFHCWAGY | Age: 54
End: 2024-12-17
Payer: COMMERCIAL

## 2024-12-17 DIAGNOSIS — N73.9: Primary | ICD-10-CM

## 2025-02-06 ENCOUNTER — HOSPITAL ENCOUNTER (OUTPATIENT)
Dept: HOSPITAL 53 - M PLALAB | Age: 55
End: 2025-02-06
Attending: FAMILY MEDICINE
Payer: COMMERCIAL

## 2025-02-06 DIAGNOSIS — R73.01: Primary | ICD-10-CM

## 2025-02-06 LAB
25(OH)D3 SERPL-MCNC: 73.9 NG/ML (ref 20–100)
ALBUMIN SERPL BCG-MCNC: 4 G/DL (ref 3.2–5.2)
ALP SERPL-CCNC: 80 U/L (ref 35–104)
ALT SERPL W P-5'-P-CCNC: 37 U/L (ref 7–40)
AST SERPL-CCNC: 26 U/L (ref ?–34)
BASOPHILS # BLD AUTO: 0.1 10^3/UL (ref 0–0.2)
BASOPHILS NFR BLD AUTO: 1.1 % (ref 0–1)
BILIRUB SERPL-MCNC: 0.7 MG/DL (ref 0.3–1.2)
BUN SERPL-MCNC: 16 MG/DL (ref 9–23)
CALCIUM SERPL-MCNC: 9.5 MG/DL (ref 8.5–10.1)
CHLORIDE SERPL-SCNC: 108 MMOL/L (ref 98–107)
CHOLEST SERPL-MCNC: 210 MG/DL (ref ?–200)
CHOLEST/HDLC SERPL: 3.99 {RATIO} (ref ?–5)
CO2 SERPL-SCNC: 25 MMOL/L (ref 20–31)
CREAT SERPL-MCNC: 0.72 MG/DL (ref 0.55–1.3)
DRVVT CONFIRM PPP QL: 30.3 SECONDS
DRVVT SCREEN TO CONFIRM RATIO: 0.73 {RATIO} (ref 0–1.2)
EOSINOPHIL # BLD AUTO: 0.2 10^3/UL (ref 0–0.5)
EOSINOPHIL NFR BLD AUTO: 3.9 % (ref 0–3)
FERRITIN SERPL-MCNC: 56.6 NG/ML (ref 7.3–270.7)
GFR SERPL CREATININE-BSD FRML MDRD: > 60 ML/MIN/{1.73_M2} (ref 51–?)
GLUCOSE SERPL-MCNC: 92 MG/DL (ref 60–100)
HCT VFR BLD AUTO: 41.1 % (ref 36–47)
HDLC SERPL-MCNC: 52.6 MG/DL (ref 40–?)
HGB BLD-MCNC: 14.1 G/DL (ref 12–15.5)
LDLC SERPL CALC-MCNC: 130.6 MG/DL (ref ?–100)
LYMPHOCYTES # BLD AUTO: 1.6 10^3/UL (ref 1.5–5)
LYMPHOCYTES NFR BLD AUTO: 34.4 % (ref 24–44)
MCH RBC QN AUTO: 31.9 PG (ref 27–33)
MCHC RBC AUTO-ENTMCNC: 34.3 G/DL (ref 32–36.5)
MCV RBC AUTO: 93 FL (ref 80–96)
MONOCYTES # BLD AUTO: 0.6 10^3/UL (ref 0–0.8)
MONOCYTES NFR BLD AUTO: 12.1 % (ref 2–8)
NEUTROPHILS # BLD AUTO: 2.2 10^3/UL (ref 1.5–8.5)
NEUTROPHILS NFR BLD AUTO: 48.3 % (ref 36–66)
NONHDLC SERPL-MCNC: 157.4 MG/DL
PLATELET # BLD AUTO: 313 10^3/UL (ref 150–450)
POTASSIUM SERPL-SCNC: 4.1 MMOL/L (ref 3.5–5.1)
PROT SERPL-MCNC: 7.2 G/DL (ref 5.7–8.2)
PTH-INTACT SERPL-MCNC: 35.9 PG/ML (ref 18.5–88)
RBC # BLD AUTO: 4.42 10^6/UL (ref 4–5.4)
SODIUM SERPL-SCNC: 143 MMOL/L (ref 136–145)
T4 FREE SERPL-MCNC: 1.31 NG/DL (ref 0.89–1.76)
TRIGL SERPL-MCNC: 134 MG/DL (ref ?–150)
TSH SERPL DL<=0.005 MIU/L-ACNC: 1.5 UIU/ML (ref 0.55–4.78)
VIT B12 SERPL-MCNC: 970 PG/ML (ref 211–911)
WBC # BLD AUTO: 4.6 10^3/UL (ref 4–10)

## 2025-02-07 LAB
CARDIOLIPIN IGA SER IA-ACNC: 2.5 APL-U/ML (ref ?–20)
CARDIOLIPIN IGG SER IA-ACNC: < 2 GPL-U/ML (ref ?–20)
CARDIOLIPIN IGM SER IA-ACNC: < 2 MPL-U/ML (ref ?–20)
INSULIN SERPL-ACNC: 12.3 UIU/ML (ref ?–18.4)

## 2025-02-13 ENCOUNTER — HOSPITAL ENCOUNTER (OUTPATIENT)
Dept: HOSPITAL 53 - M SFHCWAGY | Age: 55
End: 2025-02-13
Payer: COMMERCIAL

## 2025-02-13 DIAGNOSIS — R39.15: Primary | ICD-10-CM

## 2025-03-25 ENCOUNTER — HOSPITAL ENCOUNTER (EMERGENCY)
Dept: HOSPITAL 53 - M ED | Age: 55
Discharge: HOME | End: 2025-03-25
Payer: COMMERCIAL

## 2025-03-25 VITALS — DIASTOLIC BLOOD PRESSURE: 75 MMHG | SYSTOLIC BLOOD PRESSURE: 124 MMHG | OXYGEN SATURATION: 100 % | TEMPERATURE: 98 F

## 2025-03-25 VITALS — WEIGHT: 181.88 LBS | HEIGHT: 63 IN | BODY MASS INDEX: 32.23 KG/M2

## 2025-03-25 DIAGNOSIS — R10.9: Primary | ICD-10-CM

## 2025-03-25 DIAGNOSIS — E78.5: ICD-10-CM

## 2025-03-25 DIAGNOSIS — Z79.899: ICD-10-CM

## 2025-03-25 DIAGNOSIS — M06.9: ICD-10-CM

## 2025-03-25 DIAGNOSIS — Z88.8: ICD-10-CM

## 2025-03-25 LAB
ALBUMIN SERPL BCG-MCNC: 4 G/DL (ref 3.2–5.2)
ALP SERPL-CCNC: 91 U/L (ref 35–104)
ALT SERPL W P-5'-P-CCNC: 58 U/L (ref 7–40)
AMYLASE SERPL-CCNC: 56 U/L (ref 30–118)
AST SERPL-CCNC: 26 U/L (ref ?–34)
BASOPHILS # BLD AUTO: 0 10^3/UL (ref 0–0.2)
BASOPHILS NFR BLD AUTO: 0.6 % (ref 0–1)
BILIRUB CONJ SERPL-MCNC: 0.2 MG/DL (ref ?–0.4)
BILIRUB SERPL-MCNC: 0.7 MG/DL (ref 0.3–1.2)
BUN SERPL-MCNC: 14 MG/DL (ref 9–23)
CALCIUM SERPL-MCNC: 9.5 MG/DL (ref 8.5–10.1)
CHLORIDE SERPL-SCNC: 107 MMOL/L (ref 98–107)
CO2 SERPL-SCNC: 27 MMOL/L (ref 20–31)
CREAT SERPL-MCNC: 0.72 MG/DL (ref 0.55–1.3)
EOSINOPHIL # BLD AUTO: 0.1 10^3/UL (ref 0–0.5)
EOSINOPHIL NFR BLD AUTO: 2 % (ref 0–3)
GFR SERPL CREATININE-BSD FRML MDRD: > 60 ML/MIN/{1.73_M2} (ref 51–?)
GLUCOSE SERPL-MCNC: 89 MG/DL (ref 60–100)
HCT VFR BLD AUTO: 42.2 % (ref 36–47)
HGB BLD-MCNC: 14.5 G/DL (ref 12–15.5)
LEUKOCYTE ESTERASE UR AUTO RFX: NEGATIVE
LIPASE SERPL-CCNC: 34 U/L (ref 12–53)
LYMPHOCYTES # BLD AUTO: 1.6 10^3/UL (ref 1.5–5)
LYMPHOCYTES NFR BLD AUTO: 28.8 % (ref 24–44)
MCH RBC QN AUTO: 31.9 PG (ref 27–33)
MCHC RBC AUTO-ENTMCNC: 34.4 G/DL (ref 32–36.5)
MCV RBC AUTO: 93 FL (ref 80–96)
MONOCYTES # BLD AUTO: 0.6 10^3/UL (ref 0–0.8)
MONOCYTES NFR BLD AUTO: 10.3 % (ref 2–8)
NEUTROPHILS # BLD AUTO: 3.2 10^3/UL (ref 1.5–8.5)
NEUTROPHILS NFR BLD AUTO: 58.1 % (ref 36–66)
PLATELET # BLD AUTO: 279 10^3/UL (ref 150–450)
POTASSIUM SERPL-SCNC: 4.8 MMOL/L (ref 3.5–5.1)
PROT SERPL-MCNC: 7.1 G/DL (ref 5.7–8.2)
RBC # BLD AUTO: 4.54 10^6/UL (ref 4–5.4)
SODIUM SERPL-SCNC: 142 MMOL/L (ref 136–145)
SQUAM EPITHELIAL CELL UR AURFX: 0 /HPF (ref 0–6)
WBC # BLD AUTO: 5.4 10^3/UL (ref 4–10)

## 2025-07-16 ENCOUNTER — HOSPITAL ENCOUNTER (OUTPATIENT)
Dept: HOSPITAL 53 - M PLALAB | Age: 55
End: 2025-07-16
Attending: FAMILY MEDICINE
Payer: COMMERCIAL

## 2025-07-16 DIAGNOSIS — R73.01: Primary | ICD-10-CM

## 2025-07-16 DIAGNOSIS — E78.5: ICD-10-CM

## 2025-07-16 DIAGNOSIS — E66.3: ICD-10-CM

## 2025-07-16 DIAGNOSIS — E55.9: ICD-10-CM

## 2025-07-16 LAB
ALBUMIN SERPL BCG-MCNC: 3.7 G/DL (ref 3.2–5.2)
ALP SERPL-CCNC: 63 U/L (ref 35–104)
ALT SERPL W P-5'-P-CCNC: 21 U/L (ref 7–40)
AST SERPL-CCNC: 20 U/L (ref ?–34)
BASOPHILS # BLD AUTO: 0 10^3/UL (ref 0–0.2)
BASOPHILS NFR BLD AUTO: 0.7 % (ref 0–1)
BILIRUB SERPL-MCNC: 0.5 MG/DL (ref 0.3–1.2)
BUN SERPL-MCNC: 19 MG/DL (ref 9–23)
CALCIUM SERPL-MCNC: 8.6 MG/DL (ref 8.5–10.1)
CHLORIDE SERPL-SCNC: 106 MMOL/L (ref 98–107)
CHOLEST SERPL-MCNC: 179 MG/DL (ref ?–200)
CHOLEST/HDLC SERPL: 3.31 {RATIO} (ref ?–5)
CO2 SERPL-SCNC: 27 MMOL/L (ref 20–31)
CREAT SERPL-MCNC: 0.84 MG/DL (ref 0.55–1.3)
EOSINOPHIL # BLD AUTO: 0.2 10^3/UL (ref 0–0.5)
EOSINOPHIL NFR BLD AUTO: 2.8 % (ref 0–3)
FERRITIN SERPL-MCNC: 67.8 NG/ML (ref 7.3–270.7)
FSH SERPL-ACNC: 54.8 MIU/ML
GFR SERPL CREATININE-BSD FRML MDRD: 81.5 ML/MIN/{1.73_M2} (ref 51–?)
GLUCOSE SERPL-MCNC: 81 MG/DL (ref 60–100)
HCT VFR BLD AUTO: 37.6 % (ref 36–47)
HDLC SERPL-MCNC: 54 MG/DL (ref 40–?)
HGB BLD-MCNC: 12.5 G/DL (ref 12–15.5)
LDLC SERPL CALC-MCNC: 109 MG/DL (ref ?–100)
LH SERPL-ACNC: 17.7 MIU/ML
LYMPHOCYTES # BLD AUTO: 1.7 10^3/UL (ref 1.5–5)
LYMPHOCYTES NFR BLD AUTO: 30.7 % (ref 24–44)
MCH RBC QN AUTO: 32 PG (ref 27–33)
MCHC RBC AUTO-ENTMCNC: 33.2 G/DL (ref 32–36.5)
MCV RBC AUTO: 96.2 FL (ref 80–96)
MONOCYTES # BLD AUTO: 0.5 10^3/UL (ref 0–0.8)
MONOCYTES NFR BLD AUTO: 9.2 % (ref 2–8)
NEUTROPHILS # BLD AUTO: 3.2 10^3/UL (ref 1.5–8.5)
NEUTROPHILS NFR BLD AUTO: 56.4 % (ref 36–66)
NONHDLC SERPL-MCNC: 125 MG/DL
PLATELET # BLD AUTO: 325 10^3/UL (ref 150–450)
POTASSIUM SERPL-SCNC: 4.4 MMOL/L (ref 3.5–5.1)
PROT SERPL-MCNC: 6.5 G/DL (ref 5.7–8.2)
RBC # BLD AUTO: 3.91 10^6/UL (ref 4–5.4)
SODIUM SERPL-SCNC: 142 MMOL/L (ref 136–145)
TRIGL SERPL-MCNC: 80 MG/DL (ref ?–150)
WBC # BLD AUTO: 5.6 10^3/UL (ref 4–10)

## 2025-07-20 LAB — APO B/APO A-I SERPL: 0.58 {RATIO} (ref ?–0.63)
